# Patient Record
Sex: FEMALE | Race: WHITE | Employment: FULL TIME | URBAN - METROPOLITAN AREA
[De-identification: names, ages, dates, MRNs, and addresses within clinical notes are randomized per-mention and may not be internally consistent; named-entity substitution may affect disease eponyms.]

---

## 2019-08-18 RX ORDER — GUAIFENESIN 1200 MG/1
TABLET, EXTENDED RELEASE ORAL
COMMUNITY

## 2019-08-18 RX ORDER — RANITIDINE 150 MG/1
150 TABLET ORAL DAILY
COMMUNITY
End: 2021-02-01 | Stop reason: ALTCHOICE

## 2020-03-09 ENCOUNTER — TELEPHONE (OUTPATIENT)
Dept: FAMILY MEDICINE CLINIC | Age: 55
End: 2020-03-09

## 2020-03-09 RX ORDER — METRONIDAZOLE 500 MG/1
500 TABLET ORAL 3 TIMES DAILY
Qty: 30 TABLET | Refills: 0 | Status: SHIPPED | OUTPATIENT
Start: 2020-03-09 | End: 2020-03-19

## 2020-03-09 RX ORDER — CIPROFLOXACIN 500 MG/1
500 TABLET, FILM COATED ORAL 2 TIMES DAILY
Qty: 20 TABLET | Refills: 0 | Status: SHIPPED | OUTPATIENT
Start: 2020-03-09 | End: 2020-03-19

## 2020-03-09 NOTE — TELEPHONE ENCOUNTER
[facility]     Last visit- 04/03/19   No visit scheduled due to no insurance - having a diverticulitis flare and wants to know if you can just call in her medication.

## 2021-01-13 ENCOUNTER — OFFICE VISIT (OUTPATIENT)
Dept: FAMILY MEDICINE CLINIC | Age: 56
End: 2021-01-13
Payer: COMMERCIAL

## 2021-01-13 VITALS
HEIGHT: 64 IN | WEIGHT: 136 LBS | DIASTOLIC BLOOD PRESSURE: 70 MMHG | TEMPERATURE: 99.3 F | SYSTOLIC BLOOD PRESSURE: 112 MMHG | HEART RATE: 64 BPM | OXYGEN SATURATION: 97 % | BODY MASS INDEX: 23.22 KG/M2

## 2021-01-13 DIAGNOSIS — K59.00 CONSTIPATION, UNSPECIFIED CONSTIPATION TYPE: ICD-10-CM

## 2021-01-13 DIAGNOSIS — K27.9 PUD (PEPTIC ULCER DISEASE): ICD-10-CM

## 2021-01-13 DIAGNOSIS — R19.7 DIARRHEA, UNSPECIFIED TYPE: ICD-10-CM

## 2021-01-13 DIAGNOSIS — K57.92 DIVERTICULITIS: Primary | ICD-10-CM

## 2021-01-13 PROBLEM — K57.90 DIVERTICULOSIS: Status: ACTIVE | Noted: 2021-01-13

## 2021-01-13 PROCEDURE — 3017F COLORECTAL CA SCREEN DOC REV: CPT | Performed by: PHYSICIAN ASSISTANT

## 2021-01-13 PROCEDURE — G8420 CALC BMI NORM PARAMETERS: HCPCS | Performed by: PHYSICIAN ASSISTANT

## 2021-01-13 PROCEDURE — G8427 DOCREV CUR MEDS BY ELIG CLIN: HCPCS | Performed by: PHYSICIAN ASSISTANT

## 2021-01-13 PROCEDURE — G8484 FLU IMMUNIZE NO ADMIN: HCPCS | Performed by: PHYSICIAN ASSISTANT

## 2021-01-13 PROCEDURE — 99213 OFFICE O/P EST LOW 20 MIN: CPT | Performed by: PHYSICIAN ASSISTANT

## 2021-01-13 PROCEDURE — 4004F PT TOBACCO SCREEN RCVD TLK: CPT | Performed by: PHYSICIAN ASSISTANT

## 2021-01-13 RX ORDER — METRONIDAZOLE 500 MG/1
500 TABLET ORAL 3 TIMES DAILY
Qty: 30 TABLET | Refills: 0 | Status: SHIPPED | OUTPATIENT
Start: 2021-01-13 | End: 2021-01-23

## 2021-01-13 RX ORDER — CIPROFLOXACIN 500 MG/1
500 TABLET, FILM COATED ORAL 2 TIMES DAILY
Qty: 20 TABLET | Refills: 0 | Status: SHIPPED | OUTPATIENT
Start: 2021-01-13 | End: 2021-01-23

## 2021-01-13 RX ORDER — OMEPRAZOLE 40 MG/1
40 CAPSULE, DELAYED RELEASE ORAL DAILY
Qty: 30 CAPSULE | Refills: 2 | Status: SHIPPED | OUTPATIENT
Start: 2021-01-13 | End: 2021-02-01

## 2021-01-13 ASSESSMENT — PATIENT HEALTH QUESTIONNAIRE - PHQ9: 2. FEELING DOWN, DEPRESSED OR HOPELESS: 1

## 2021-01-13 NOTE — PROGRESS NOTES
History    Marital status: Single     Spouse name: None    Number of children: None    Years of education: None    Highest education level: None   Occupational History    None   Social Needs    Financial resource strain: None    Food insecurity     Worry: None     Inability: None    Transportation needs     Medical: None     Non-medical: None   Tobacco Use    Smoking status: Current Every Day Smoker     Packs/day: 2.00    Smokeless tobacco: Never Used   Substance and Sexual Activity    Alcohol use: No    Drug use: No    Sexual activity: Yes     Partners: Female   Lifestyle    Physical activity     Days per week: None     Minutes per session: None    Stress: None   Relationships    Social connections     Talks on phone: None     Gets together: None     Attends Christianity service: None     Active member of club or organization: None     Attends meetings of clubs or organizations: None     Relationship status: None    Intimate partner violence     Fear of current or ex partner: None     Emotionally abused: None     Physically abused: None     Forced sexual activity: None   Other Topics Concern    None   Social History Narrative    None        CURRENT MEDICATIONS  Current Outpatient Medications   Medication Sig Dispense Refill    ciprofloxacin (CIPRO) 500 MG tablet Take 1 tablet by mouth 2 times daily for 10 days 20 tablet 0    metroNIDAZOLE (FLAGYL) 500 MG tablet Take 1 tablet by mouth 3 times daily for 10 days 30 tablet 0    omeprazole (PRILOSEC) 40 MG delayed release capsule Take 1 capsule by mouth daily 30 capsule 2    Psyllium (METAMUCIL SMOOTH TEXTURE) 28.3 % POWD Take by mouth 1 tablespoon with 8 ounces of water every day. OTC      Probiotic Product (PROBIOTIC DAILY PO) Take 1 tablet by mouth daily      dextromethorphan-guaiFENesin (MUCINEX DM)  MG per SR tablet Take 1 tablet by mouth every 12 hours as needed.       guaiFENesin (MUCINEX MAXIMUM STRENGTH) 1200 MG TB12 Take by mouth 1 tablet daily. OTC      ranitidine (ZANTAC 150 MAXIMUM STRENGTH) 150 MG tablet Take 150 mg by mouth daily      ibuprofen (ADVIL;MOTRIN) 200 MG tablet Take 200 mg by mouth every 6 hours as needed for Pain. No current facility-administered medications for this visit. PHYSICAL EXAM    /70   Pulse 64   Temp 99.3 °F (37.4 °C)   Ht 5' 4\" (1.626 m)   Wt 136 lb (61.7 kg)   LMP 11/30/2013 (Within Years) Comment: states a year and a half ago  SpO2 97%   BMI 23.34 kg/m²     Physical Exam  Vitals signs and nursing note reviewed. Constitutional:       Appearance: Normal appearance. HENT:      Head: Normocephalic and atraumatic. Abdominal:      General: Abdomen is flat. Bowel sounds are normal.      Palpations: Abdomen is soft. Tenderness: There is abdominal tenderness in the right lower quadrant, suprapubic area and left lower quadrant. Neurological:      Mental Status: She is alert. Psychiatric:         Mood and Affect: Mood normal.         ASSESSMENT & PLAN    1. Diverticulitis  2. PUD (peptic ulcer disease)  3. Diarrhea, unspecified type  4. Constipation, unspecified constipation type  Patient symptoms are improving but she is still with tenderness so we will cover with Cipro and Flagyl. I do wonder if a lot of her symptoms may be related to untreated overproduction of acid that had previously caused her ulcer. She may not have an ulcer currently but may have gastritis that is contributing to some of her symptoms. I recommended her restart her omeprazole and may even try a gluten-free diet. We will refer to GI for further evaluation and treatment. In the meantime increase her fiber intake and maintain good hydration with water. - ciprofloxacin (CIPRO) 500 MG tablet; Take 1 tablet by mouth 2 times daily for 10 days  Dispense: 20 tablet; Refill: 0  - metroNIDAZOLE (FLAGYL) 500 MG tablet; Take 1 tablet by mouth 3 times daily for 10 days  Dispense: 30 tablet;  Refill: 0  - [facility] Holter, CNP, Gastroenterology, [place]  - omeprazole (PRILOSEC) 40 MG delayed release capsule; Take 1 capsule by mouth daily  Dispense: 30 capsule; Refill: 2             Electronically signed by Sloane David PA-C on 1/13/2021    Please note that this chart was generated using dragon dictation software. Although every effort was made to ensure the accuracy of this automated transcription, some errors in transcription may have occurred.

## 2021-02-01 ENCOUNTER — OFFICE VISIT (OUTPATIENT)
Dept: GASTROENTEROLOGY | Age: 56
End: 2021-02-01
Payer: COMMERCIAL

## 2021-02-01 VITALS
HEIGHT: 64 IN | BODY MASS INDEX: 23.47 KG/M2 | OXYGEN SATURATION: 97 % | TEMPERATURE: 96.7 F | HEART RATE: 80 BPM | SYSTOLIC BLOOD PRESSURE: 100 MMHG | WEIGHT: 137.5 LBS | DIASTOLIC BLOOD PRESSURE: 62 MMHG

## 2021-02-01 DIAGNOSIS — K58.2 IRRITABLE BOWEL SYNDROME WITH BOTH CONSTIPATION AND DIARRHEA: ICD-10-CM

## 2021-02-01 DIAGNOSIS — K92.1 BLACK STOOL: Primary | ICD-10-CM

## 2021-02-01 DIAGNOSIS — K21.9 GASTROESOPHAGEAL REFLUX DISEASE, UNSPECIFIED WHETHER ESOPHAGITIS PRESENT: ICD-10-CM

## 2021-02-01 DIAGNOSIS — Z87.19 HISTORY OF DIVERTICULITIS: ICD-10-CM

## 2021-02-01 PROCEDURE — G8484 FLU IMMUNIZE NO ADMIN: HCPCS | Performed by: NURSE PRACTITIONER

## 2021-02-01 PROCEDURE — 3017F COLORECTAL CA SCREEN DOC REV: CPT | Performed by: NURSE PRACTITIONER

## 2021-02-01 PROCEDURE — G8420 CALC BMI NORM PARAMETERS: HCPCS | Performed by: NURSE PRACTITIONER

## 2021-02-01 PROCEDURE — G8427 DOCREV CUR MEDS BY ELIG CLIN: HCPCS | Performed by: NURSE PRACTITIONER

## 2021-02-01 PROCEDURE — 4004F PT TOBACCO SCREEN RCVD TLK: CPT | Performed by: NURSE PRACTITIONER

## 2021-02-01 PROCEDURE — 99203 OFFICE O/P NEW LOW 30 MIN: CPT | Performed by: NURSE PRACTITIONER

## 2021-02-01 RX ORDER — POLYETHYLENE GLYCOL 3350, SODIUM SULFATE ANHYDROUS, SODIUM BICARBONATE, SODIUM CHLORIDE, POTASSIUM CHLORIDE 236; 22.74; 6.74; 5.86; 2.97 G/4L; G/4L; G/4L; G/4L; G/4L
4 POWDER, FOR SOLUTION ORAL ONCE
Qty: 4000 ML | Refills: 0 | Status: SHIPPED | OUTPATIENT
Start: 2021-02-01 | End: 2021-02-01

## 2021-02-01 ASSESSMENT — ENCOUNTER SYMPTOMS
VOMITING: 0
CONSTIPATION: 1
WHEEZING: 0
NAUSEA: 0
SHORTNESS OF BREATH: 0
BACK PAIN: 1
PHOTOPHOBIA: 0
COLOR CHANGE: 0
EYE PAIN: 0
ABDOMINAL PAIN: 0
COUGH: 0
DIARRHEA: 0
BLOOD IN STOOL: 0

## 2021-02-01 NOTE — PROGRESS NOTES
Esau Montoya 48 y.o. female was seen by PIOTR Aguiar on 2/1/2021     Wt Readings from Last 3 Encounters:   02/01/21 137 lb 8 oz (62.4 kg)   01/13/21 136 lb (61.7 kg)   11/26/16 150 lb (68 kg)       HPI  Esau Montoya is a pleasant 48 y.o.  female who presents today for black stools, history of diverticulitis and irritable bowel syndrome. She has a past medical history of diverticular disease and irritable bowel. She denies NSAID or alcohol use. Her first attack of diverticulitis was in 5- with CT of abdomen/pelvis showing diverticulitis. Her last EGD was done thirty years ago with uncertain results. Her last colonoscopy was done five years ago at [facility] with per patient record normal colonoscopy. She was treated for diverticulitis a week ago and completed her antibiotics on Sunday. Her bowel pattern fluctuates with diarrhea and constipation. She mentioned her bowel pattern this week is daily with soft brown formed stools. This can alternate with segmented stools with mucus. She is taking essental oils digize at night. Eating salads causes diarrhea. She noticed black tarry stools two weeks ago. No excess belching or excess flatulence. Her appetite is poor with early satiety. Her weight is down twenty pounds. She is eating small meals. No nausea or vomiting. No abdominal pain, bloating or distention. Her heartburn and acid reflux has been ongoing for over fifteen years. She was on Zantac in the past and mentioned last use of Prilosec was fifteen years ago. She takes Tums on occasion. No nocturnal awakenings with acid reflux. She is avoiding caffeine and alcohol. No dysphagia or pain with swallowing. No family history of colon or stomach cancer. ROS  Review of Systems   Constitutional: Positive for appetite change. Negative for chills, diaphoresis, fatigue and fever. HENT: Negative for ear pain, hearing loss and tinnitus.     Eyes: Negative for photophobia, pain and visual disturbance. Respiratory: Negative for cough, shortness of breath and wheezing. Cardiovascular: Negative for chest pain, palpitations and leg swelling. Gastrointestinal: Positive for constipation. Negative for abdominal pain, blood in stool, diarrhea, nausea and vomiting. Endocrine: Negative for cold intolerance, heat intolerance and polydipsia. Genitourinary: Positive for frequency. Negative for dysuria and urgency. Musculoskeletal: Positive for back pain. Negative for myalgias and neck pain. Skin: Negative for color change, pallor and rash. Allergic/Immunologic: Positive for food allergies (Milk). Negative for environmental allergies. Neurological: Negative for dizziness, seizures, weakness and headaches. Hematological: Bruises/bleeds easily. Psychiatric/Behavioral: Positive for dysphoric mood. Negative for sleep disturbance and suicidal ideas. The patient is not nervous/anxious. Allergies  Allergies   Allergen Reactions    Lac Bovis Other (See Comments)     Unknown    Other Other (See Comments)     Grass - Unknown    Zithromax [Azithromycin] Hives    Augmentin [Amoxicillin-Pot Clavulanate] Nausea And Vomiting    Oxycodone Nausea And Vomiting    Percocet [Oxycodone-Acetaminophen] Nausea And Vomiting    Vicodin [Hydrocodone-Acetaminophen] Nausea And Vomiting       Medications  Current Outpatient Medications   Medication Sig Dispense Refill    omeprazole (PRILOSEC) 40 MG delayed release capsule Take 1 capsule by mouth daily 30 capsule 2    Psyllium (METAMUCIL SMOOTH TEXTURE) 28.3 % POWD Take by mouth 1 tablespoon with 8 ounces of water every day. OTC      guaiFENesin (MUCINEX MAXIMUM STRENGTH) 1200 MG TB12 Take by mouth 1 tablet daily.  OTC      Probiotic Product (PROBIOTIC DAILY PO) Take 1 tablet by mouth daily      ranitidine (ZANTAC 150 MAXIMUM STRENGTH) 150 MG tablet Take 150 mg by mouth daily      ibuprofen (ADVIL;MOTRIN) 200 MG tablet Take 200 mg by mouth every 6 hours as needed for Pain.  dextromethorphan-guaiFENesin (MUCINEX DM)  MG per SR tablet Take 1 tablet by mouth every 12 hours as needed. No current facility-administered medications for this visit. Past medical history:   She has a past medical history of Diverticular disease and Irritable bowel. Past surgical history:  She has a past surgical history that includes Cholecystectomy (2015); Dilation and curettage of uterus (1988); Nasal septum surgery (1992); and Hysterectomy (12/2/15). Social History:  She reports that she has been smoking. She has been smoking about 2.00 packs per day. She has never used smokeless tobacco. She reports that she does not drink alcohol or use drugs. Family history:  Her family history includes Diabetes in her father; High Blood Pressure in her sister. Objective    Vitals:    02/01/21 1437   BP: 100/62   Pulse: 80   Temp: 96.7 °F (35.9 °C)   SpO2: 97%        Physical exam    Physical Exam  Vitals signs reviewed. Constitutional:       General: She is not in acute distress. Appearance: She is well-developed and normal weight. She is not ill-appearing, toxic-appearing or diaphoretic. HENT:      Head: Normocephalic and atraumatic. Nose: Nose normal.      Mouth/Throat:      Mouth: Mucous membranes are moist.   Eyes:      Conjunctiva/sclera: Conjunctivae normal.      Pupils: Pupils are equal, round, and reactive to light. Neck:      Musculoskeletal: Normal range of motion and neck supple. Thyroid: No thyromegaly. Vascular: No JVD. Trachea: No tracheal deviation. Cardiovascular:      Rate and Rhythm: Normal rate and regular rhythm. Pulses: Normal pulses. Heart sounds: Normal heart sounds. No murmur. No friction rub. No gallop. Pulmonary:      Effort: Pulmonary effort is normal. No respiratory distress. Breath sounds: Normal breath sounds. No stridor. No wheezing, rhonchi or rales. Chest:      Chest wall: No tenderness. Abdominal:      General: Bowel sounds are normal. There is no distension. Palpations: Abdomen is soft. There is no mass. Tenderness: There is no abdominal tenderness. There is no guarding or rebound. Hernia: No hernia is present. Musculoskeletal: Normal range of motion. Lymphadenopathy:      Cervical: No cervical adenopathy. Skin:     General: Skin is warm and dry. Neurological:      Mental Status: She is alert and oriented to person, place, and time. Psychiatric:         Mood and Affect: Mood normal.         No visits with results within 2 Month(s) from this visit.    Latest known visit with results is:   Admission on 11/26/2016, Discharged on 11/26/2016   Component Date Value Ref Range Status    Ventricular Rate 11/26/2016 62  BPM Final    Atrial Rate 11/26/2016 62  BPM Final    P-R Interval 11/26/2016 112  ms Final    QRS Duration 11/26/2016 92  ms Final    Q-T Interval 11/26/2016 448  ms Final    QTc Calculation (Bazett) 11/26/2016 454  ms Final    P Axis 11/26/2016 59  degrees Final    R Axis 11/26/2016 66  degrees Final    T Axis 11/26/2016 62  degrees Final    Diagnosis 11/26/2016    Final                    Value:Normal sinus rhythm with sinus arrhythmia  Nonspecific ST abnormality  Abnormal ECG  No previous ECGs available  Confirmed by Israel Iqbal MD, Ramila Botello ([id number]) on 11/27/2016 3:36:06 PM      WBC 11/26/2016 11.5* 4.0 - 10.5 K/CU MM Final    RBC 11/26/2016 4.82  4.2 - 5.4 M/CU MM Final    Hemoglobin 11/26/2016 15.7  12.5 - 16.0 GM/DL Final    Hematocrit 11/26/2016 44.4  37 - 47 % Final    MCV 11/26/2016 92.1  78 - 100 FL Final    MCH 11/26/2016 32.6* 27 - 31 PG Final    MCHC 11/26/2016 35.4  32.0 - 36.0 % Final    RDW 11/26/2016 12.2  11.7 - 14.9 % Final    Platelets 09/84/5878 283  140 - 440 K/CU MM Final    MPV 11/26/2016 9.3  7.5 - 11.1 FL Final    Differential Type 11/26/2016 AUTOMATED DIFFERENTIAL   Final    Segs Relative 11/26/2016 76.8* 36 - 66 % Final    Lymphocytes % 11/26/2016 19.3* 24 - 44 % Final    Monocytes % 11/26/2016 3.4  0 - 4 % Final    Eosinophils % 11/26/2016 0.0  0 - 3 % Final    Basophils % 11/26/2016 0.2  0 - 1 % Final    Segs Absolute 11/26/2016 8.8  K/CU MM Final    Lymphocytes Absolute 11/26/2016 2.2  K/CU MM Final    Monocytes Absolute 11/26/2016 0.4  K/CU MM Final    Eosinophils Absolute 11/26/2016 0.0  K/CU MM Final    Basophils Absolute 11/26/2016 0.0  K/CU MM Final    Nucleated RBC % 11/26/2016 0.0  % Final    Total Nucleated RBC 11/26/2016 0.0  K/CU MM Final    Total Immature Neutrophil 11/26/2016 0.04  K/CU MM Final    Immature Neutrophil % 11/26/2016 0.3  0 - 0.43 % Final    Sodium 11/26/2016 140  135 - 145 MMOL/L Final    Potassium 11/26/2016 3.6  3.5 - 5.1 MMOL/L Final    Chloride 11/26/2016 98* 99 - 110 mMol/L Final    CO2 11/26/2016 21  21 - 32 MMOL/L Final    BUN 11/26/2016 13  6 - 23 MG/DL Final    CREATININE 11/26/2016 1.0  0.6 - 1.1 MG/DL Final    Glucose, Fasting 11/26/2016 157* 70 - 99 MG/DL Final    Calcium 11/26/2016 9.9  8.3 - 10.6 MG/DL Final    Albumin 11/26/2016 4.8  3.4 - 5.0 GM/DL Final    Total Protein 11/26/2016 7.5  6.4 - 8.2 GM/DL Final    Total Bilirubin 11/26/2016 0.8  0.0 - 1.0 MG/DL Final    ALT 11/26/2016 31  10 - 40 U/L Final    AST 11/26/2016 34  15 - 37 IU/L Final    Alkaline Phosphatase 11/26/2016 91  40 - 129 IU/L Final    GFR Non- 11/26/2016 59  mL/min/1.73m2 Final    GFR  11/26/2016 >60  mL/min/1.73m2 Final    Anion Gap 11/26/2016 21* 4 - 16 Final    Color, UA 11/26/2016 YELLOW  UYELL Final    Clarity, UA 11/26/2016 CLEAR  CLEAR Final    Glucose, Urine 11/26/2016 NEGATIVE  NEG MG/DL Final    Bilirubin Urine 11/26/2016 NEGATIVE  NEG MG/DL Final    Ketones, Urine 11/26/2016 LARGE* NEG MG/DL Final    Specific [place], UA 11/26/2016 1.026  1.001 - 1.035 Final    Blood, Urine 11/26/2016 NEGATIVE  NEG Final    pH, Urine 11/26/2016 9.0* 5.0 - 8.0 Final    Protein, UA 11/26/2016 100* NEG MG/DL Final    Urobilinogen, Urine 11/26/2016 NORMAL  0.2 - 1.0 EU/DL Final    Nitrite Urine, Quantitative 11/26/2016 NEGATIVE  NEG Final    Leukocyte Esterase, Urine 11/26/2016 NEGATIVE  NEG Final    RBC, UA 11/26/2016 4  0 - 6 /HPF Final    WBC, UA 11/26/2016 1  0 - 5 /HPF Final    Bacteria, UA 11/26/2016 RARE* NEG /HPF Final    Squam Epithel, UA 11/26/2016 <1  /HPF Final    Mucus, UA 11/26/2016 OCCASIONAL* NEG HPF Final    Troponin T 11/26/2016 <0.010  <0.01 NG/ML Final    Troponin T 11/26/2016 <0.010  <0.01 NG/ML Final       Assessment and Plan:  1. Will plan for an EGD/colonoscopy with MAC anesthesia. The patient was informed of the risks and benefits of the procedures. 2.  Black stools will order EGD/colonoscopy to rule out peptic ulcer disease and GI source for bleeding. Will order CBC to evaluate for anemia. The patient was instructed to avoid NSAIDs. 3.  Acid reflux vs. Dyspepsia that is long term. The patient was encouraged to restart taking Prilosec 40 mg daily for treatment. The patient was encouraged to continue with anti-reflux measures and avoid foods that trigger. 4.  History of diverticulitis recommend colonoscopy to rule out neoplasm. The patient was encouraged to continue with a high fiber diet. Recommend good bowel regimen with an increase in fruit, fiber and fluids to prevent constipation. 5.  IBS with mix of diarrhea/constipation. Her bowel pattern is stable at this time. The patient was encouraged to continue taking Metamucil daily. Recommend increasing fruit, fiber and fluids. 6.  Further recommendations for follow-up will be determined after the EGD/colonoscopy have been completed.

## 2021-02-02 RX ORDER — OMEPRAZOLE 40 MG/1
40 CAPSULE, DELAYED RELEASE ORAL
Qty: 90 CAPSULE | Refills: 3 | Status: SHIPPED | OUTPATIENT
Start: 2021-02-02 | End: 2022-06-21

## 2021-02-03 DIAGNOSIS — Z01.818 PRE-OP TESTING: Primary | ICD-10-CM

## 2021-02-04 ENCOUNTER — PREP FOR PROCEDURE (OUTPATIENT)
Dept: GASTROENTEROLOGY | Age: 56
End: 2021-02-04

## 2021-02-04 RX ORDER — SODIUM CHLORIDE, SODIUM LACTATE, POTASSIUM CHLORIDE, CALCIUM CHLORIDE 600; 310; 30; 20 MG/100ML; MG/100ML; MG/100ML; MG/100ML
INJECTION, SOLUTION INTRAVENOUS CONTINUOUS
Status: CANCELLED | OUTPATIENT
Start: 2021-02-04

## 2021-02-04 RX ORDER — SODIUM CHLORIDE 0.9 % (FLUSH) 0.9 %
10 SYRINGE (ML) INJECTION EVERY 12 HOURS SCHEDULED
Status: CANCELLED | OUTPATIENT
Start: 2021-02-04

## 2021-02-04 RX ORDER — SODIUM CHLORIDE 0.9 % (FLUSH) 0.9 %
10 SYRINGE (ML) INJECTION PRN
Status: CANCELLED | OUTPATIENT
Start: 2021-02-04

## 2021-02-05 ENCOUNTER — NURSE ONLY (OUTPATIENT)
Dept: SURGERY | Age: 56
End: 2021-02-05
Payer: COMMERCIAL

## 2021-02-05 ENCOUNTER — HOSPITAL ENCOUNTER (OUTPATIENT)
Age: 56
Setting detail: SPECIMEN
Discharge: HOME | End: 2021-02-05

## 2021-02-05 ENCOUNTER — TELEPHONE (OUTPATIENT)
Dept: PULMONOLOGY | Age: 56
End: 2021-02-05

## 2021-02-05 ENCOUNTER — HOSPITAL ENCOUNTER (OUTPATIENT)
Age: 56
Discharge: HOME | End: 2021-02-05

## 2021-02-05 VITALS — TEMPERATURE: 98.4 F | DIASTOLIC BLOOD PRESSURE: 80 MMHG | SYSTOLIC BLOOD PRESSURE: 124 MMHG

## 2021-02-05 DIAGNOSIS — Z01.818 PRE-OP TESTING: Primary | ICD-10-CM

## 2021-02-05 LAB
ALBUMIN SERPL-MCNC: 4.3 GM/DL (ref 3.4–5)
ALP BLD-CCNC: 60 IU/L (ref 40–128)
ALT SERPL-CCNC: 23 U/L (ref 10–40)
ANION GAP SERPL CALCULATED.3IONS-SCNC: 9 MMOL/L (ref 4–16)
AST SERPL-CCNC: 25 IU/L (ref 15–37)
BASOPHILS ABSOLUTE: 0 K/CU MM
BASOPHILS RELATIVE PERCENT: 0.4 % (ref 0–1)
BILIRUB SERPL-MCNC: 0.2 MG/DL (ref 0–1)
BUN BLDV-MCNC: 10 MG/DL (ref 6–23)
CALCIUM SERPL-MCNC: 9 MG/DL (ref 8.3–10.6)
CHLORIDE BLD-SCNC: 105 MMOL/L (ref 99–110)
CO2: 27 MMOL/L (ref 21–32)
CREAT SERPL-MCNC: 0.8 MG/DL (ref 0.6–1.1)
DIFFERENTIAL TYPE: ABNORMAL
EOSINOPHILS ABSOLUTE: 0.1 K/CU MM
EOSINOPHILS RELATIVE PERCENT: 1 % (ref 0–3)
GFR AFRICAN AMERICAN: >60 ML/MIN/1.73M2
GFR NON-AFRICAN AMERICAN: >60 ML/MIN/1.73M2
GLUCOSE BLD-MCNC: 110 MG/DL (ref 70–99)
HCT VFR BLD CALC: 43.4 % (ref 37–47)
HEMOGLOBIN: 14.1 GM/DL (ref 12.5–16)
IMMATURE NEUTROPHIL %: 0.3 % (ref 0–0.43)
LYMPHOCYTES ABSOLUTE: 3 K/CU MM
LYMPHOCYTES RELATIVE PERCENT: 42 % (ref 24–44)
MCH RBC QN AUTO: 32.3 PG (ref 27–31)
MCHC RBC AUTO-ENTMCNC: 32.5 % (ref 32–36)
MCV RBC AUTO: 99.3 FL (ref 78–100)
MONOCYTES ABSOLUTE: 0.5 K/CU MM
MONOCYTES RELATIVE PERCENT: 7.4 % (ref 0–4)
NUCLEATED RBC %: 0 %
PDW BLD-RTO: 12.3 % (ref 11.7–14.9)
PLATELET # BLD: 248 K/CU MM (ref 140–440)
PMV BLD AUTO: 9 FL (ref 7.5–11.1)
POTASSIUM SERPL-SCNC: 4.5 MMOL/L (ref 3.5–5.1)
RBC # BLD: 4.37 M/CU MM (ref 4.2–5.4)
SEGMENTED NEUTROPHILS ABSOLUTE COUNT: 3.5 K/CU MM
SEGMENTED NEUTROPHILS RELATIVE PERCENT: 48.9 % (ref 36–66)
SODIUM BLD-SCNC: 141 MMOL/L (ref 135–145)
TOTAL IMMATURE NEUTOROPHIL: 0.02 K/CU MM
TOTAL NUCLEATED RBC: 0 K/CU MM
TOTAL PROTEIN: 6.4 GM/DL (ref 6.4–8.2)
WBC # BLD: 7.1 K/CU MM (ref 4–10.5)

## 2021-02-05 PROCEDURE — 85025 COMPLETE CBC W/AUTO DIFF WBC: CPT

## 2021-02-05 PROCEDURE — 99211 OFF/OP EST MAY X REQ PHY/QHP: CPT | Performed by: NURSE PRACTITIONER

## 2021-02-05 PROCEDURE — 80053 COMPREHEN METABOLIC PANEL: CPT

## 2021-02-05 PROCEDURE — U0002 COVID-19 LAB TEST NON-CDC: HCPCS

## 2021-02-05 PROCEDURE — 36415 COLL VENOUS BLD VENIPUNCTURE: CPT

## 2021-02-05 NOTE — PROGRESS NOTES
Patient collected pre-op COVID-19 screening instruction and collection supplies given to patient accordingly. Patient denies fever/cough/sob or recent travels. Patient voiced understanding of collection/quarantine instructions. COVID screening lab ordered, collection completed without difficulty, identifiers placed on specimen. AVS given at discharge. Results will be given via mychart or telephone call [facility] Dept will manage any positive test results, the procedure will be rescheduled at a later date).

## 2021-02-05 NOTE — PATIENT INSTRUCTIONS
Pre-Procedure COVID-19 Self Testing  Quarantine Instructions  Day of Surgery Instructions         What to do before my surgery:    All patients scheduled for elective surgery must test for COVID19 72-96 hours prior to the surgery date.  Pre-Procedure COVID-19 Self-Test will be scheduled for you by your provider.  You can receive your Pre-Procedure COVID-19 Self-Test at:  [facility]. [address], [facility], [facility], [address]   If you do not have the COVID-19 test we will cancel or reschedule your procedure   Once you test you must quarantine at home until after your procedure with only your immediate family members or whoever lives with you.  If you must work during your quarantine period, we ask that you continue to practice social distancing, wear a mask that covers your mouth and nose and perform all hand hygiene as recommended by the CDC.  If you must go to the grocery, etc. and cannot get someone to do this for you please wear a mask that covers your mouth and nose and perform all hand hygiene as recommended by the CDC.  Your surgeon's office will notify you with any concerns about your test result. What can I expect on the day of surgery?  Arrive at the time the office or hospital staff tell you on the day of your procedure.  Wear a mask when entering the hospital.     A member of the hospital staff will take your temperature and ask you a few questions as you enter the building.  In abundance of caution for the safety of all our patients and staff, please follow all hospital visitor guidelines in place at the time of your procedure. The staff caring for you will stay in close communication with your loved one and keep them updated on progress.  Please provide a phone number for us to use when communicating with your family or ride home.    When you are ready to discharge, we will notify your family/person with you to bring the car to the front entrance. We will take you to them after you receive all of your discharge instructions.

## 2021-02-05 NOTE — TELEPHONE ENCOUNTER
Per my call to Ondina Huerta at [facility] KWADWO BRENNER is necessary for C-scope if done at OP facility.  VPQ#[id number]

## 2021-02-06 LAB
SARS-COV-2: NOT DETECTED
SOURCE: NORMAL

## 2021-02-07 LAB
SARS-COV-2: NOT DETECTED
SOURCE: NORMAL

## 2021-02-08 NOTE — PROGRESS NOTES
Surgery 02/12/21 @ 1000 arrive @ 0845               1. Do not eat or drink anything after midnight - unless instructed by your doctor prior to surgery. This includes                   no water, chewing gum or mints. 2. Follow your directions as prescribed by the doctor for your procedure and medications. Take Omeprazole with a sip morning of procedure. 3. Check with your Doctor regarding stopping Plavix, Coumadin, Lovenox,Effient,Pradaxa,Xarelto, Fragmin or other blood thinners and                   follow their instructions. 4. Do not smoke, and do not drink any alcoholic beverages 24 hours prior to surgery. This includes NA Beer. 5. You may brush your teeth and gargle the morning of surgery. DO NOT SWALLOW WATER   6. You MUST make arrangements for a responsible adult to take you home after your surgery and be able to check on you every couple                   hours for the day. You will not be allowed to leave alone or drive yourself home. It is strongly suggested someone stay with you the first 24                   hrs. Your surgery will be cancelled if you do not have a ride home. 7. Please wear simple, loose fitting clothing to the hospital.  Luz Vasquez not bring valuables (money, credit cards, checkbooks, etc.) Do not wear any                   makeup (including no eye makeup) or nail polish on your fingers or toes. 8. DO NOT wear any jewelry or piercings on day of surgery. All body piercing jewelry must be removed. 9. If you have dentures, they will be removed before going to the OR; we will provide you a container. If you wear contact lenses or glasses,                  they will be removed; please bring a case for them. 10. If you  have a Living Will and Durable Power of  for Healthcare, please bring in a copy. 11. Please bring picture ID,  insurance card, paperwork from the doctors office    (H & P, Consent, & card for implantable devices).

## 2021-02-10 ENCOUNTER — ANESTHESIA EVENT (OUTPATIENT)
Dept: OPERATING ROOM | Age: 56
End: 2021-02-10
Payer: COMMERCIAL

## 2021-02-10 ASSESSMENT — LIFESTYLE VARIABLES: SMOKING_STATUS: 1

## 2021-02-10 NOTE — ANESTHESIA PRE PROCEDURE
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       Past Medical History:        Diagnosis Date    Diverticular disease     has had 2 episodes of diverticulitis    Irritable bowel        Past Surgical History:        Procedure Laterality Date    CHOLECYSTECTOMY  2015    DILATION AND CURETTAGE OF UTERUS  1988    ENDOSCOPY, COLON, DIAGNOSTIC      HYSTERECTOMY  12/2/15    robotic assisted LAVH BSO    NASAL SEPTUM SURGERY  1992       Social History:    Social History     Tobacco Use    Smoking status: Current Every Day Smoker     Packs/day: 2.00    Smokeless tobacco: Never Used   Substance Use Topics    Alcohol use: No                                Ready to quit: Not Answered  Counseling given: Not Answered      Vital Signs (Current):   Vitals:    02/08/21 1035   Weight: 137 lb (62.1 kg)   Height: 5' 4\" (1.626 m)                                              BP Readings from Last 3 Encounters:   02/05/21 124/80   02/01/21 100/62   01/13/21 112/70       NPO Status:                                                                                 BMI:   Wt Readings from Last 3 Encounters:   02/01/21 137 lb 8 oz (62.4 kg)   01/13/21 136 lb (61.7 kg)   11/26/16 150 lb (68 kg)     Body mass index is 23.52 kg/m².     CBC:   Lab Results   Component Value Date    WBC 7.1 02/05/2021    RBC 4.37 02/05/2021    HGB 14.1 02/05/2021    HCT 43.4 02/05/2021    MCV 99.3 02/05/2021    RDW 12.3 02/05/2021     02/05/2021       CMP:   Lab Results   Component Value Date     02/05/2021    K 4.5 02/05/2021     02/05/2021 CO2 27 02/05/2021    BUN 10 02/05/2021    CREATININE 0.8 02/05/2021    GFRAA >60 02/05/2021    LABGLOM >60 02/05/2021    GLUCOSE 110 02/05/2021    PROT 6.4 02/05/2021    CALCIUM 9.0 02/05/2021    BILITOT 0.2 02/05/2021    ALKPHOS 60 02/05/2021    AST 25 02/05/2021    ALT 23 02/05/2021       POC Tests: No results for input(s): POCGLU, POCNA, POCK, POCCL, POCBUN, POCHEMO, POCHCT in the last 72 hours. Coags:   Lab Results   Component Value Date    APTT 26.5 11/30/2015       HCG (If Applicable):   Lab Results   Component Value Date    PREGTESTUR negative 05/30/2013        ABGs: No results found for: PHART, PO2ART, JFF6DXM, TPT3LKN, BEART, P0ILFANT     Type & Screen (If Applicable):  No results found for: LABABO, LABRH    Drug/Infectious Status (If Applicable):  No results found for: HIV, HEPCAB    COVID-19 Screening (If Applicable):   Lab Results   Component Value Date    COVID19 NOT DETECTED 02/05/2021         Anesthesia Evaluation  Patient summary reviewed and Nursing notes reviewed no history of anesthetic complications:   Airway: Mallampati: II  TM distance: >3 FB   Neck ROM: full  Mouth opening: > = 3 FB Dental: normal exam         Pulmonary: breath sounds clear to auscultation  (+) current smoker          Patient did not smoke on day of surgery. Cardiovascular:  Exercise tolerance: good (>4 METS),           Rhythm: regular  Rate: normal           Beta Blocker:  Not on Beta Blocker         Neuro/Psych:   Negative Neuro/Psych ROS              GI/Hepatic/Renal:   (+) PUD,           Endo/Other: Negative Endo/Other ROS                    Abdominal:           Vascular: negative vascular ROS. Anesthesia Plan      MAC     ASA 2     ( Pre Anesthesia Assessment complete. Chart reviewed on 2/10/2021)  Induction: intravenous. Anesthetic plan and risks discussed with patient. Plan discussed with JD.                 FABRICE Chambers - JD   2/10/2021

## 2021-02-11 ENCOUNTER — TELEPHONE (OUTPATIENT)
Dept: GASTROENTEROLOGY | Age: 56
End: 2021-02-11

## 2021-02-12 ENCOUNTER — ANESTHESIA (OUTPATIENT)
Dept: OPERATING ROOM | Age: 56
End: 2021-02-12
Payer: COMMERCIAL

## 2021-02-12 ENCOUNTER — HOSPITAL ENCOUNTER (OUTPATIENT)
Age: 56
Setting detail: OUTPATIENT SURGERY
Discharge: HOME | End: 2021-02-12
Attending: SURGERY | Admitting: SURGERY
Payer: COMMERCIAL

## 2021-02-12 VITALS
OXYGEN SATURATION: 97 % | DIASTOLIC BLOOD PRESSURE: 86 MMHG | BODY MASS INDEX: 23.39 KG/M2 | TEMPERATURE: 97.6 F | RESPIRATION RATE: 16 BRPM | HEART RATE: 88 BPM | SYSTOLIC BLOOD PRESSURE: 108 MMHG | HEIGHT: 64 IN | WEIGHT: 137 LBS

## 2021-02-12 VITALS
TEMPERATURE: 98.6 F | DIASTOLIC BLOOD PRESSURE: 47 MMHG | SYSTOLIC BLOOD PRESSURE: 104 MMHG | OXYGEN SATURATION: 100 % | RESPIRATION RATE: 16 BRPM

## 2021-02-12 PROCEDURE — 2580000003 HC RX 258: Performed by: NURSE PRACTITIONER

## 2021-02-12 PROCEDURE — 3609012400 HC EGD TRANSORAL BIOPSY SINGLE/MULTIPLE: Performed by: SURGERY

## 2021-02-12 PROCEDURE — 7100000011 HC PHASE II RECOVERY - ADDTL 15 MIN: Performed by: SURGERY

## 2021-02-12 PROCEDURE — 43239 EGD BIOPSY SINGLE/MULTIPLE: CPT | Performed by: SURGERY

## 2021-02-12 PROCEDURE — 7100000010 HC PHASE II RECOVERY - FIRST 15 MIN: Performed by: SURGERY

## 2021-02-12 PROCEDURE — 3700000001 HC ADD 15 MINUTES (ANESTHESIA): Performed by: SURGERY

## 2021-02-12 PROCEDURE — 6370000000 HC RX 637 (ALT 250 FOR IP): Performed by: SURGERY

## 2021-02-12 PROCEDURE — 3700000000 HC ANESTHESIA ATTENDED CARE: Performed by: SURGERY

## 2021-02-12 PROCEDURE — 88305 TISSUE EXAM BY PATHOLOGIST: CPT | Performed by: PATHOLOGY

## 2021-02-12 PROCEDURE — 6360000002 HC RX W HCPCS: Performed by: NURSE ANESTHETIST, CERTIFIED REGISTERED

## 2021-02-12 PROCEDURE — 88342 IMHCHEM/IMCYTCHM 1ST ANTB: CPT | Performed by: PATHOLOGY

## 2021-02-12 PROCEDURE — 3609027000 HC COLONOSCOPY: Performed by: SURGERY

## 2021-02-12 PROCEDURE — 2709999900 HC NON-CHARGEABLE SUPPLY: Performed by: SURGERY

## 2021-02-12 PROCEDURE — 45378 DIAGNOSTIC COLONOSCOPY: CPT | Performed by: SURGERY

## 2021-02-12 RX ORDER — SIMETHICONE 20 MG/.3ML
EMULSION ORAL PRN
Status: DISCONTINUED | OUTPATIENT
Start: 2021-02-12 | End: 2021-02-12 | Stop reason: ALTCHOICE

## 2021-02-12 RX ORDER — SODIUM CHLORIDE 0.9 % (FLUSH) 0.9 %
10 SYRINGE (ML) INJECTION EVERY 12 HOURS SCHEDULED
Status: DISCONTINUED | OUTPATIENT
Start: 2021-02-12 | End: 2021-02-12 | Stop reason: HOSPADM

## 2021-02-12 RX ORDER — PROPOFOL 10 MG/ML
INJECTION, EMULSION INTRAVENOUS PRN
Status: DISCONTINUED | OUTPATIENT
Start: 2021-02-12 | End: 2021-02-12 | Stop reason: SDUPTHER

## 2021-02-12 RX ORDER — SODIUM CHLORIDE 0.9 % (FLUSH) 0.9 %
10 SYRINGE (ML) INJECTION PRN
Status: DISCONTINUED | OUTPATIENT
Start: 2021-02-12 | End: 2021-02-12 | Stop reason: HOSPADM

## 2021-02-12 RX ORDER — LIDOCAINE HYDROCHLORIDE 20 MG/ML
INJECTION, SOLUTION INTRAVENOUS PRN
Status: DISCONTINUED | OUTPATIENT
Start: 2021-02-12 | End: 2021-02-12 | Stop reason: SDUPTHER

## 2021-02-12 RX ORDER — SODIUM CHLORIDE, SODIUM LACTATE, POTASSIUM CHLORIDE, CALCIUM CHLORIDE 600; 310; 30; 20 MG/100ML; MG/100ML; MG/100ML; MG/100ML
INJECTION, SOLUTION INTRAVENOUS CONTINUOUS
Status: DISCONTINUED | OUTPATIENT
Start: 2021-02-12 | End: 2021-02-12 | Stop reason: HOSPADM

## 2021-02-12 RX ADMIN — LIDOCAINE HYDROCHLORIDE 100 MG: 20 INJECTION, SOLUTION INTRAVENOUS at 10:59

## 2021-02-12 RX ADMIN — PROPOFOL 80 MG: 10 INJECTION, EMULSION INTRAVENOUS at 10:59

## 2021-02-12 RX ADMIN — SODIUM CHLORIDE, POTASSIUM CHLORIDE, SODIUM LACTATE AND CALCIUM CHLORIDE: 600; 310; 30; 20 INJECTION, SOLUTION INTRAVENOUS at 09:19

## 2021-02-12 RX ADMIN — PROPOFOL 320 MG: 10 INJECTION, EMULSION INTRAVENOUS at 11:01

## 2021-02-12 ASSESSMENT — PAIN - FUNCTIONAL ASSESSMENT: PAIN_FUNCTIONAL_ASSESSMENT: 0-10

## 2021-02-12 NOTE — PROCEDURES
PROCEDURE NOTE    DATE OF PROCEDURE: 2/12/2021     SURGEON: Jarocho Baugh M.D.    ASSISTANT: None    PREOPERATIVE DIAGNOSIS: mild antral gastritis    POSTOPERATIVE DIAGNOSIS: Same    OPERATION: Esophagogastroduodenoscopy with biopsies    ANESTHESIA: Local monitored anesthesia    ESTIMATED BLOOD LOSS: None    COMPLICATIONS: None apparent    SPECIMENS: were obtained    HISTORY: The patient is a 48y.o. year old female with history of the above preoperative diagnosis. I recommended esophagogastroduodenoscopy with possible biopsy and I explained the risk, benefits, expected outcome, and alternatives to the procedure. Risks included but are not limited to bleeding, infection, respiratory distress, hypotension, and perforation of the esophagus, stomach, or duodenum. Patient understands and is in agreement, wishing to proceed. PROCEDURE: The patient was brought into the endoscopy suite and the appropriate monitors were connected to the patient. A timeout was held with all members of the procedure team present and in agreement. The patient was positioned in the left lateral decubitus position with a bite block in place. The endoscope was inserted into the patient's mouth and advanced from the oropharynx into the hypopharynx without difficulty and under direct vision. The scope was then advanced through the patient's esophagus under direct vision into the stomach, pylorus, and duodenum. A retroflexed view of the gastroesophageal junction was performed. Biopsies were taken. A summary of the findings are as follows:      Duodenum:     Descending: normal    Bulb: normal    Stomach:    Antrum: mild to moderate gastritis, biopsies taken for H.pylori    Body: normal    Fundus: normal    Esophagus: normal    Larynx: normal    The stomach was desufflated and the endoscope was removed.  The patient tolerated the procedure well, and was transferred to the PACU following the procedure in stable condition. IMPRESSION/PLAN:   1. Continue PPI. Will follow path results for H.pylori testing.     Electronically signed by Arina Cheng MD on 2/12/2021 at 11:53 AM

## 2021-02-12 NOTE — PROCEDURES
PROCEDURE NOTE    DATE OF PROCEDURE: 2/12/2021    SURGEON: PRITI Herbert Deeds: None    PREOPERATIVE DIAGNOSIS: dark stools, recent diverticulitis    POSTOPERATIVE DIAGNOSIS:   1. Sigmoid Diverticulosis, severe  2. Internal and external hemorrhoids     OPERATION: Total colonoscopy     ANESTHESIA: Local monitored anesthesia. ESTIMATED BLOOD LOSS: None. COMPLICATIONS: None. SPECIMENS: were not obtained    HISTORY: The patient is a 48y.o. year old female with history of above preop diagnosis. I recommended colonoscopy with possible biopsy or polypectomy and I explained the risk, benefits, expected outcome, and alternatives to the procedure. Risks included but are not limited to bleeding, infection, respiratory distress, hypotension, and perforation of the colon. The patient understands and was in agreement. PROCEDURE: The patient was given sedation per anesthesia. The patient was given oxygen by nasal cannula. The patient's SPO2 remained appropriate throughout the procedure. The colonoscope was inserted per rectum and advanced under direct vision to the cecum with mild difficulty due to tortuosity and severe diverticulosis in the sigmoid. Findings:  Cecum/Ascending colon: normal    Transverse colon: normal    Descending/Sigmoid colon: severe diverticulosis in the sigmoid colon. No active inflammation. Rectum/Anus: examined in normal and retroflexed positions and was positive for internal and external hemorrhoids. The colon was decompressed and the scope was removed. The patient tolerated the procedure well. IMPRESSION/PLAN:   1. Diverticulosis and hemorrhoid. Recommend fiber supplementation. Can discuss elective sigmoid colectomy for her recurrent diverticulitis however she has never had an episode of complicated diverticulitis that would push us towards operative intervention. 2. Repeat colonoscopy in 10 years.     Electronically signed by Monty Coronel MD on 2/12/2021 at 11:55 AM

## 2021-02-12 NOTE — H&P
No chief complaint on file. SUBJECTIVE:  HPI: Patient  presents today for evaluation and possible need of EGD/colonoscopy. Patient has had colonoscopy in the past 5 years ago at [facility] which was normal per mirian. She had an EGD 30 years ago. She has a history of GERD. She has been having dark stools with blood in the stools. She was treated for diverticulitis in January. Denies abdominal pain currently. Patient has been experiencing the following symptoms:  Diarrhea and constipation. The patient denies: nausea or vomiting. There is no family history of colon cancer. I have reviewed the patient's(pertinent information to this visit) medical history, family history(scanned in  the Media tab under \"patient questioner\"), social history and review ofsystems with the patient today in the office.          Past Surgical History:   Procedure Laterality Date    CHOLECYSTECTOMY  2015    DILATION AND CURETTAGE OF UTERUS  1988    ENDOSCOPY, COLON, DIAGNOSTIC      HYSTERECTOMY  12/2/15    robotic assisted LAVH BSO    NASAL SEPTUM SURGERY  1992       Past Medical History:   Diagnosis Date    Diverticular disease     has had 2 episodes of diverticulitis    Irritable bowel        Family History   Problem Relation Age of Onset    Arthritis Mother     Diabetes Father     High Blood Pressure Sister        Social History     Socioeconomic History    Marital status:      Spouse name: Not on file    Number of children: Not on file    Years of education: Not on file    Highest education level: Not on file   Occupational History    Not on file   Social Needs    Financial resource strain: Not on file    Food insecurity     Worry: Not on file     Inability: Not on file    Transportation needs     Medical: Not on file     Non-medical: Not on file   Tobacco Use    Smoking status: Current Every Day Smoker     Packs/day: 2.00    Smokeless tobacco: Never Used   Substance and Sexual Activity    Alcohol use: No    Drug use: No    Sexual activity: Yes     Partners: Female   Lifestyle    Physical activity     Days per week: Not on file     Minutes per session: Not on file    Stress: Not on file   Relationships    Social connections     Talks on phone: Not on file     Gets together: Not on file     Attends Scientologist service: Not on file     Active member of club or organization: Not on file     Attends meetings of clubs or organizations: Not on file     Relationship status: Not on file    Intimate partner violence     Fear of current or ex partner: Not on file     Emotionally abused: Not on file     Physically abused: Not on file     Forced sexual activity: Not on file   Other Topics Concern    Not on file   Social History Narrative    Not on file       Current Facility-Administered Medications   Medication Dose Route Frequency Provider Last Rate Last Admin    lactated ringers infusion   Intravenous Continuous Jinger Chiquito, APRN - CNP 75 mL/hr at 02/12/21 0919 New Bag at 02/12/21 0919    sodium chloride flush 0.9 % injection 10 mL  10 mL Intravenous 2 times per day Jinger Chiquito, APRN - CNP        sodium chloride flush 0.9 % injection 10 mL  10 mL Intravenous PRN Jinger Chiquito, APRN - CNP            Allergies   Allergen Reactions    Lac Bovis Other (See Comments)     Unknown    Other Other (See Comments)     Grass - Unknown    Zithromax [Azithromycin] Hives    Augmentin [Amoxicillin-Pot Clavulanate] Nausea And Vomiting    Oxycodone Nausea And Vomiting    Percocet [Oxycodone-Acetaminophen] Nausea And Vomiting    Vicodin [Hydrocodone-Acetaminophen] Nausea And Vomiting       Review of Systems:       Review of Systems   Constitutional: Negative for chills. Negative for fever. HENT: Negative for congestion. Negative for rhinorrhea. Respiratory: Negative for cough. Negative for shortness of breath. Negative for wheezing.     Cardiovascular: Negative for chest pain.   Gastrointestinal: as per HPI  Genitourinary: Negative for difficulty urinating. Neurological: Negative for dizziness, syncope and numbness. Hematological: Does not bruise/bleed easily. OBJECTIVE:  Physical Exam:    BP (!) 102/59   Pulse 58   Temp 97.7 °F (36.5 °C) (Temporal)   Resp 16   Ht 5' 4\" (1.626 m)   Wt 137 lb (62.1 kg)   LMP 11/30/2013 (Within Years) Comment: states a year and a half ago  SpO2 97%   BMI 23.52 kg/m²      Physical Exam  General: awake, alert, in no acute distress  HEENT: mucous membranes moist  Respiratory: normal effort, no wheezes appreciated  CV: appears well perfused, regular rate and rhythm  Abdomen: Soft, non-tender, non-distended. No guarding or rebound tenderness. Skin: warm and dry  Extremities: atraumatic  Neuro: no focal deficits noted  Psych: mood normal        ASSESSMENT:  Blood in the stools, recent diverticulitis. PLAN:  Treatment:    -Will proceed with EGD and colonoscopy. -Reviewed in detail with the patient and/or family the expected pre-operative, operative, and post-operative courses including risks, benefits, and alternatives to the procedure. The patient's questions were answered in detail and agreed to proceed with the procedure. Lisseth Best MD    The patient was counseled at length about the risks of raleigh Covid-19 during their perioperative period and any recovery window from their procedure. The patient was made aware that raleigh Covid-19  may worsen their prognosis for recovering from their procedure  and lend to a higher morbidity and/or mortality risk. All material risks, benefits, and reasonable alternatives including postponing the procedure were discussed. The patient does wish to proceed with the procedure at this time.

## 2021-02-12 NOTE — FLOWSHEET NOTE
Returned to room Q1. Report received from [address] . Alert and oriented. Respirations even and unlabored. Color pink. Abdomen soft and nontender. Beverage provided. Call light in reach.

## 2021-02-22 ENCOUNTER — TELEPHONE (OUTPATIENT)
Dept: GASTROENTEROLOGY | Age: 56
End: 2021-02-22

## 2021-02-22 ENCOUNTER — TELEMEDICINE (OUTPATIENT)
Dept: GASTROENTEROLOGY | Age: 56
End: 2021-02-22
Payer: COMMERCIAL

## 2021-02-22 DIAGNOSIS — K57.30 DIVERTICULOSIS LARGE INTESTINE W/O PERFORATION OR ABSCESS W/O BLEEDING: ICD-10-CM

## 2021-02-22 DIAGNOSIS — K58.2 IRRITABLE BOWEL SYNDROME WITH BOTH CONSTIPATION AND DIARRHEA: ICD-10-CM

## 2021-02-22 DIAGNOSIS — K29.50 CHRONIC GASTRITIS WITHOUT BLEEDING, UNSPECIFIED GASTRITIS TYPE: Primary | ICD-10-CM

## 2021-02-22 PROBLEM — K27.9 PUD (PEPTIC ULCER DISEASE): Status: RESOLVED | Noted: 2021-01-13 | Resolved: 2021-02-22

## 2021-02-22 PROCEDURE — 99212 OFFICE O/P EST SF 10 MIN: CPT | Performed by: NURSE PRACTITIONER

## 2021-02-22 PROCEDURE — 4004F PT TOBACCO SCREEN RCVD TLK: CPT | Performed by: NURSE PRACTITIONER

## 2021-02-22 PROCEDURE — 3017F COLORECTAL CA SCREEN DOC REV: CPT | Performed by: NURSE PRACTITIONER

## 2021-02-22 PROCEDURE — G8484 FLU IMMUNIZE NO ADMIN: HCPCS | Performed by: NURSE PRACTITIONER

## 2021-02-22 PROCEDURE — G8427 DOCREV CUR MEDS BY ELIG CLIN: HCPCS | Performed by: NURSE PRACTITIONER

## 2021-02-22 PROCEDURE — G8420 CALC BMI NORM PARAMETERS: HCPCS | Performed by: NURSE PRACTITIONER

## 2021-02-22 NOTE — PROGRESS NOTES
2021    TELEHEALTH EVALUATION -- Audio/Visual (During DJWDS-72 public health emergency)    HPI:    Aparna Butler (:  1965) has requested an audio/video evaluation for the following concern(s): Follow-up on EGD/colonoscopy. Her EGD showed normal appearing esophagus, duodenum and mild antral gastritis. Her stomach biopsies showed normal tissue with no evidence of H. Pylori infection. Her colonoscopy revealed severe sigmoid diverticulosis, internal and external hemorrhoids. She reports feeling good. Her bowel pattern has normalized. She is having daily soft brown formed stools. Her typical bowel pattern fluctuates with diarrhea and constipation. Metamucil helps and takes once daily. Eating salads causes diarrhea. She had a minimal amount of bleeding after her colonoscopy. No current blood in her stool or black tarry stools. No excess belching or excess flatulence. Her appetite is fair and weight is stable. No nausea or vomiting. No abdominal pain, bloating or distention. Her heartburn is controlled with taking Prilosec. No nocturnal awakenings with acid reflux. No dysphagia or pain with swallowing. No family history of colon or stomach cancer.       ROS  Review of Systems   Constitutional: Positive for appetite change. Negative for chills, diaphoresis, fatigue and fever. HENT: Negative for ear pain, hearing loss and tinnitus. Eyes: Negative for photophobia, pain and visual disturbance. Respiratory: Negative for cough, shortness of breath and wheezing. Cardiovascular: Negative for chest pain, palpitations and leg swelling. Gastrointestinal: Positive for constipation. Negative for abdominal pain, blood in stool, diarrhea, nausea and vomiting. Endocrine: Negative for cold intolerance, heat intolerance and polydipsia. Genitourinary: Positive for frequency. Negative for dysuria and urgency. Musculoskeletal: Positive for back pain. Negative for myalgias and neck pain. Eyes:  EOM    [x]  Normal  [] Abnormal-  Sclera  []  Normal  [] Abnormal -         Discharge []  None visible  [] Abnormal -    HENT:   [x] Normocephalic, atraumatic. [] Abnormal   [] Mouth/Throat: Mucous membranes are moist.     External Ears [x] Normal  [] Abnormal-     Neck: [x] No visualized mass     Pulmonary/Chest: [x] Respiratory effort normal.  [x] No visualized signs of difficulty breathing or respiratory distress        [] Abnormal-      Musculoskeletal:   [x] Normal gait with no signs of ataxia         [x] Normal range of motion of neck        [] Abnormal-       Neurological:        [x] No Facial Asymmetry (Cranial nerve 7 motor function) (limited exam to video visit)          [] No gaze palsy        [] Abnormal-         Skin:        [x] No significant exanthematous lesions or discoloration noted on facial skin         [] Abnormal-            Psychiatric:       [x] Normal Affect [] No Hallucinations        [] Abnormal-       ASSESSMENT/PLAN:  1. Gastritis possibly due to heartburn vs. dyspepsia without dysphagia or odynophagia. The patient was encouraged to take Prilosec 40 mg every other day and may consider weaning off at next visit unless her symptoms worsen. Her EGD showed normal esophagus and mild antral gastritis with no evidence of H. Pylori infection. The patient was encouraged to continue with anti-reflux measures and avoid foods that trigger. 2.  Sigmoid diverticulosis without bleeding. Her colonoscopy showed severe sigmoid diverticulosis. Her first attack of diverticulitis was in 5- noted on CT of the abdomen/pelvis. She was treated for diverticulitis a month ago. The patient was encouraged to continue with a high fiber diet. Recommend good bowel regimen with an increase in fruit, fiber and fluids to prevent constipation.   May consider surgical consultation if patient continues to have attacks of complicated diverticulitis (no current evidence of abscess or narrowing of the colon). 3.  IBS with mix of diarrhea/constipation. Her bowel pattern is stable at this time. The patient was encouraged to continue taking Metamucil daily. Recommend increasing her fruit, fiber and fluids. Avoid foods that worsen i.e.. fatty foods, greasy foods, sugar, highly refined carbohydrates etc.  4.  The patient was encouraged to follow-up in 2 months. Ethan Lennon is a 48 y.o. female being evaluated by a Virtual Visit (video visit) encounter to address concerns as mentioned above. A caregiver was present when appropriate. Due to this being a TeleHealth encounter (During WWKEZ-51 public health emergency), evaluation of the following organ systems was limited: Vitals/Constitutional/EENT/Resp/CV/GI//MS/Neuro/Skin/Heme-Lymph-Imm. Pursuant to the emergency declaration under the [address] [place] [address], [address] [place] [address] authority and the [employer] and [employer] Act, this Virtual Visit was conducted with patient's (and/or legal guardian's) consent, to reduce the patient's risk of exposure to COVID-19 and provide necessary medical care. The patient (and/or legal guardian) has also been advised to contact this office for worsening conditions or problems, and seek emergency medical treatment and/or call 911 if deemed necessary. Patient identification was verified at the start of the visit: Yes she verified her full name and date of birth. Total time spent on this encounter: 15 minutes. Services were provided through a video synchronous discussion virtually to substitute for in-person clinic visit. Patient and provider were located at their individual homes. --FABRICE Krishnamurthy CNP on 2/22/2021 at 11:12 AM    An electronic signature was used to authenticate this note.

## 2021-03-05 ENCOUNTER — VIRTUAL VISIT (OUTPATIENT)
Dept: GASTROENTEROLOGY | Age: 56
End: 2021-03-05

## 2021-03-05 DIAGNOSIS — K29.70 GASTRITIS WITHOUT BLEEDING, UNSPECIFIED CHRONICITY, UNSPECIFIED GASTRITIS TYPE: ICD-10-CM

## 2021-03-05 DIAGNOSIS — R11.2 ACUTE NAUSEA WITH NONBILIOUS VOMITING: Primary | ICD-10-CM

## 2021-03-05 DIAGNOSIS — Z87.19 HISTORY OF DIVERTICULITIS: ICD-10-CM

## 2021-03-05 DIAGNOSIS — K58.2 IRRITABLE BOWEL SYNDROME WITH BOTH CONSTIPATION AND DIARRHEA: ICD-10-CM

## 2021-03-05 PROCEDURE — 99212 OFFICE O/P EST SF 10 MIN: CPT | Performed by: NURSE PRACTITIONER

## 2021-03-05 PROCEDURE — 4004F PT TOBACCO SCREEN RCVD TLK: CPT | Performed by: NURSE PRACTITIONER

## 2021-03-05 PROCEDURE — G8484 FLU IMMUNIZE NO ADMIN: HCPCS | Performed by: NURSE PRACTITIONER

## 2021-03-05 PROCEDURE — G8427 DOCREV CUR MEDS BY ELIG CLIN: HCPCS | Performed by: NURSE PRACTITIONER

## 2021-03-05 PROCEDURE — 3017F COLORECTAL CA SCREEN DOC REV: CPT | Performed by: NURSE PRACTITIONER

## 2021-03-05 PROCEDURE — G8420 CALC BMI NORM PARAMETERS: HCPCS | Performed by: NURSE PRACTITIONER

## 2021-03-05 RX ORDER — ONDANSETRON 4 MG/1
4 TABLET, FILM COATED ORAL DAILY PRN
Qty: 30 TABLET | Refills: 2 | Status: SHIPPED | OUTPATIENT
Start: 2021-03-05 | End: 2022-06-21

## 2021-03-05 NOTE — PROGRESS NOTES
Whit Pritchard is a 48 y.o. female evaluated via telephone on 3/5/2021. Consent:  She is aware that that she may receive a bill for this telephone service, depending on her insurance coverage, and has provided verbal consent to proceed: Yes she agreed to the telephone visit. Documentation:  I communicated with the patient about follow-up on acute episode of nausea and vomiting. She mentioned she had three episodes of nausea and vomiting after her colonoscopy. She recalled worse after eating pancakes with butter on them or if she over eats. She endorses lactose intolerance. Her nausea has improved a little today. No current vomiting. Her appetite is good and weight is stable. No abdominal pain, bloating or distention.  Her heartburn and acid reflux is controlled with taking Prilosec every other day. No nocturnal awakenings with acid reflux.  No dysphagia or pain with swallowing. No excess belching or flatulence. Her EGD and colonoscopy were done by Dr. Km Mora on 2-. Her EGD showed normal appearing esophagus, duodenum and mild antral gastritis. Her stomach biopsies showed normal tissue with no evidence of H. Pylori infection. Her colonoscopy revealed severe sigmoid diverticulosis, internal external hemorrhoids. She is having daily soft brown formed stools. No current diarrhea or constipation. She is taking Metamucil daily. No blood in her stool or black tarry stools.  No family history of colon or stomach cancer.       ROS  Review of Systems   Constitutional: Positive for appetite change. Negative for chills, diaphoresis, fatigue and fever. HENT: Negative for ear pain, hearing loss and tinnitus.    Eyes: Negative for photophobia, pain and visual disturbance. Respiratory: Negative for cough, shortness of breath and wheezing.    Cardiovascular: Negative for chest pain, palpitations and leg swelling.    Gastrointestinal: Negative for abdominal pain, blood in stool, diarrhea, nausea and vomiting. Endocrine: Negative for cold intolerance, heat intolerance and polydipsia. Genitourinary: Positive for frequency. Negative for dysuria and urgency. Musculoskeletal: Positive for back pain. Negative for myalgias and neck pain. Skin: Negative for color change, pallor and rash. Allergic/Immunologic: Positive for food allergies (Milk). Negative for environmental allergies. Neurological: Negative for dizziness, seizures, weakness and headaches. Hematological: Bruises/bleeds easily. Psychiatric/Behavioral: Positive for dysphoric mood. Negative for sleep disturbance and suicidal ideas. The patient is not nervous/anxious.      Physical Exam  Unable to obtain vitals signs due to telephone visit. Constitutional:       General: She is not in acute distress. HENT:      Head: Normocephalic and atraumatic. Neck:      Musculoskeletal: Normal range of motion. Pulmonary:      Effort: Pulmonary effort is normal. No respiratory distress. Musculoskeletal: Normal range of motion. Skin:     General: Skin is warm and dry. Neurological:      Mental Status: She is alert and oriented to person, place, and time. Psychiatric:         Mood and Affect: Mood normal.      ASSESSMENT/PLAN:  1.  Acute episode of nausea with vomiting most likely from diet,overeating or gastritis. Will order Zofran 4 mg as needed for nausea. The patient was encouraged to take Prilosec 40 mg daily. Recommend avoidance of NSAID's. Her recent EGD showed normal esophagus and mild antral gastritis with no evidence of H. Pylori infection. The patient was encouraged to continue with anti-reflux measures and avoid foods that trigger (lactose). 2.  Sigmoid diverticulosis without bleeding. Her colonoscopy showed severe sigmoid diverticulosis. No current abdominal pain. Her first attack of diverticulitis was in 5- noted on CT of the abdomen/pelvis. She was treated for diverticulitis a month ago.  The patient was encouraged to continue with a high fiber diet.  Recommend good bowel regimen with an increase in fruit, fiber and fluids to prevent constipation. May consider surgical consultation if patient continues to have attacks of complicated diverticulitis (no current evidence of abscess or narrowing of the colon). 3.  IBS with mix of diarrhea/constipation.  Her bowel pattern is stable at this time.  The patient was encouraged to continue taking Metamucil daily.  Recommend increasing her fruit, fiber and fluids.  Avoid foods that worsen i.e. fatty foods, greasy foods, sugar, highly refined carbohydrates etc.  4.  The patient was encouraged to follow-up in 2 months.       I affirm this is a Patient Initiated Episode with a Patient who has not had a related appointment within my department in the past 7 days or scheduled within the next 24 hours. Patient identification was verified at the start of the visit: Yes she verified her full name and date of birth. Total Time: 15 minutes. The visit was conducted pursuant to the emergency declaration under the [address] [place] [address], [address] [place] [address] authority and the [employer] and [employer] General Act. Patient identification was verified, and a caregiver was present when appropriate. The patient was located in a state where the provider was credentialed to provide care.     Note: not billable if this call serves to triage the patient into an appointment for the relevant concern      Soledad White

## 2021-09-30 ENCOUNTER — TELEPHONE (OUTPATIENT)
Dept: FAMILY MEDICINE CLINIC | Age: 56
End: 2021-09-30

## 2021-09-30 NOTE — TELEPHONE ENCOUNTER
Pt stated the hemorrhoids have improved and is no longer experiencing bleeding. Pt scheduled to see dr. Rudolph Hernandez 10/14/21 for leg and foot cramps, headaches.

## 2021-09-30 NOTE — TELEPHONE ENCOUNTER
----- Message from Chelly Persaud sent at 9/28/2021  9:03 AM EDT -----  Subject: Message to Provider    QUESTIONS  Information for Provider? patient still has the hemorrhoids and still with   a significant amount of bleeding when she uses the bathroom. She has been   using Tucks and preparation H but doesnt help for very long. Now is having   foot and leg cramps at night and has had a bad headache for he last 3   days. Please contact patient to schedule appt.   ---------------------------------------------------------------------------  --------------  CALL BACK INFO  What is the best way for the office to contact you? OK to leave message on   voicemail  Preferred Call Back Phone Number? [phone]  ---------------------------------------------------------------------------  --------------  SCRIPT ANSWERS  Relationship to Patient? Self  (Is the patient requesting to be seen urgently for their symptoms?)? No  Is this follow up request related to routine Diabetes Management? No  Are you having any new concerns about your existing condition?  Yes

## 2021-12-15 ENCOUNTER — OFFICE VISIT (OUTPATIENT)
Dept: FAMILY MEDICINE CLINIC | Age: 56
End: 2021-12-15
Payer: COMMERCIAL

## 2021-12-15 ENCOUNTER — HOSPITAL ENCOUNTER (OUTPATIENT)
Age: 56
Setting detail: SPECIMEN
Discharge: HOME | End: 2021-12-15
Payer: COMMERCIAL

## 2021-12-15 VITALS
TEMPERATURE: 97.8 F | RESPIRATION RATE: 16 BRPM | OXYGEN SATURATION: 99 % | HEART RATE: 73 BPM | WEIGHT: 135 LBS | BODY MASS INDEX: 23.05 KG/M2 | HEIGHT: 64 IN

## 2021-12-15 DIAGNOSIS — R68.89 FLU-LIKE SYMPTOMS: Primary | ICD-10-CM

## 2021-12-15 DIAGNOSIS — H66.91 RIGHT OTITIS MEDIA, UNSPECIFIED OTITIS MEDIA TYPE: ICD-10-CM

## 2021-12-15 DIAGNOSIS — Z87.19 HX OF DIVERTICULITIS OF COLON: ICD-10-CM

## 2021-12-15 PROCEDURE — 4004F PT TOBACCO SCREEN RCVD TLK: CPT | Performed by: NURSE PRACTITIONER

## 2021-12-15 PROCEDURE — G8427 DOCREV CUR MEDS BY ELIG CLIN: HCPCS | Performed by: NURSE PRACTITIONER

## 2021-12-15 PROCEDURE — 99213 OFFICE O/P EST LOW 20 MIN: CPT | Performed by: NURSE PRACTITIONER

## 2021-12-15 PROCEDURE — G8484 FLU IMMUNIZE NO ADMIN: HCPCS | Performed by: NURSE PRACTITIONER

## 2021-12-15 PROCEDURE — 3017F COLORECTAL CA SCREEN DOC REV: CPT | Performed by: NURSE PRACTITIONER

## 2021-12-15 PROCEDURE — G8420 CALC BMI NORM PARAMETERS: HCPCS | Performed by: NURSE PRACTITIONER

## 2021-12-15 PROCEDURE — U0005 INFEC AGEN DETEC AMPLI PROBE: HCPCS

## 2021-12-15 PROCEDURE — U0003 INFECTIOUS AGENT DETECTION BY NUCLEIC ACID (DNA OR RNA); SEVERE ACUTE RESPIRATORY SYNDROME CORONAVIRUS 2 (SARS-COV-2) (CORONAVIRUS DISEASE [COVID-19]), AMPLIFIED PROBE TECHNIQUE, MAKING USE OF HIGH THROUGHPUT TECHNOLOGIES AS DESCRIBED BY CMS-2020-01-R: HCPCS

## 2021-12-15 RX ORDER — AMOXICILLIN AND CLAVULANATE POTASSIUM 875; 125 MG/1; MG/1
1 TABLET, FILM COATED ORAL 2 TIMES DAILY
Qty: 20 TABLET | Refills: 0 | Status: SHIPPED | OUTPATIENT
Start: 2021-12-15 | End: 2021-12-17 | Stop reason: SINTOL

## 2021-12-15 NOTE — PATIENT INSTRUCTIONS
Your COVID 19 test can take 1-5 days for the results to come back. We ask that you make a Mychart page and view your test results this way. You will need to Self quarantine until you know your results. Increase fluids and rest  Saline nasal spray as needed for nasal congestion  Warm salt gargles as needed for throat discomfort  Monitor temperature twice a day  Tylenol as needed for fevers and/or discomfort. Big deep breaths periodically throughout the day  Regular Mucinex over the counter as needed for chest congestion  If symptoms worsen -Go to the ER. Follow up with your primary care provider      To Whom it May Concern:    Karena Lozano was tested for COVID-19 12/15/2021. He/she must stay home until test results are back. If test is positive, he/she must quarantine for a total of 10 days starting from day one of symptom onset. He/she must also be fever-free for 24 hours at that time, and also have improvement in symptoms. We do not recommend retesting as patients may continue to test positive for months even though no longer contagious. It is suggested you call [address] or [address] with any questions regarding quarantine timeframe/return to work/school details. Steps to help prevent the spread of COVID-19 if you are sick  SOURCE - https://bree-york.info/. html     Stay home except to get medical care   ; Stay home: People who are mildly ill with COVID-19 are able to isolate at home during their illness. You should restrict activities outside your home, except for getting medical care.   ; Avoid public areas: Do not go to work, school, or public areas.   ; Avoid public transportation: Avoid using public transportation, ride-sharing, or taxis.  ; Separate yourself from other people and animals in your home   ; Stay away from others: As much as possible, you should stay in a specific room and away from other people in your home.  Also, you should use a separate bathroom, if available.   ; Limit contact with pets & animals: You should restrict contact with pets and other animals while you are sick with COVID-19, just like you would around other people. Although there have not been reports of pets or other animals becoming sick with COVID-19, it is still recommended that people sick with COVID-19 limit contact with animals until more information is known about the virus. ; When possible, have another member of your household care for your animals while you are sick. If you are sick with COVID-19, avoid contact with your pet, including petting, snuggling, being kissed or licked, and sharing food. If you must care for your pet or be around animals while you are sick, wash your hands before and after you interact with pets and wear a facemask. See COVID-19 and Animals for more information. Other considerations   The ill person should eat/be fed in their room if possible. Non-disposable  items used should be handled with gloves and washed with hot water or in a . Clean hands after handling used  items.  If possible, dedicate a lined trash can for the ill person. Use gloves when removing garbage bags, handling, and disposing of trash. Wash hands after handling or disposing of trash.  Consider consulting with your local health department about trash disposal guidance if available. Information for Household Members and Caregivers of Someone who is Sick   Call ahead before visiting your doctor   Call ahead: If you have a medical appointment, call the healthcare provider and tell them that you have or may have COVID-19. This will help the healthcare provider's office take steps to keep other people from getting infected or exposed. Wear a facemask if you are sick   ;  If you are sick: You should wear a facemask when you are around other people (e.g., sharing a room or vehicle) or pets and before you enter a healthcare provider's office. ; If you are caring for others: If the person who is sick is not able to wear a facemask (for example, because it causes trouble breathing), then people who live with the person who is sick should not stay in the same room with them, or they should wear a facemask if they enter a room with the person who is sick. Cover your coughs and sneezes   ; Cover: Cover your mouth and nose with a tissue when you cough or sneeze.   ; Dispose: Throw used tissues in a lined trash can.   ; Wash hands: Immediately wash your hands with soap and water for at least 20 seconds or, if soap and water are not available, clean your hands with an alcohol-based hand  that contains at least 60% alcohol. Clean your hands often   ; Wash hands: Wash your hands often with soap and water for at least 20 seconds, especially after blowing your nose, coughing, or sneezing; going to the bathroom; and before eating or preparing food.   ; Hand : If soap and water are not readily available, use an alcohol-based hand  with at least 60% alcohol, covering all surfaces of your hands and rubbing them together until they feel dry.   ; Soap and water: Soap and water are the best option if hands are visibly dirty.   ; Avoid touching: Avoid touching your eyes, nose, and mouth with unwashed hands. Handwashing Tips   ; Wet your hands with clean, running water (warm or cold), turn off the tap, and apply soap.  ; Lather your hands by rubbing them together with the soap. Lather the backs of your hands, between your fingers, and under your nails. ; Scrub your hands for at least 20 seconds. Need a timer? Hum [place] from beginning to end twice.  ; Rinse your hands well under clean, running water.  ; Dry your hands using a clean towel or air dry them. Avoid sharing personal household items   ; Do not share:  You should not share dishes, drinking glasses, cups, eating utensils, towels, or bedding with other people or pets in your home.   ; Wash thoroughly after use: After using these items, they should be washed thoroughly with soap and water. Clean all high-touch surfaces everyday   ; Clean and disinfect: Practice routine cleaning of high touch surfaces.  ; High touch surfaces include counters, tabletops, doorknobs, bathroom fixtures, toilets, phones, keyboards, tablets, and bedside tables.  ; Disinfect areas with bodily fluids: Also, clean any surfaces that may have blood, stool, or body fluids on them.   ; Household : Use a household cleaning spray or wipe, according to the label instructions. Labels contain instructions for safe and effective use of the cleaning product including precautions you should take when applying the product, such as wearing gloves and making sure you have good ventilation during use of the product. Monitor your symptoms   Seek medical attention: Seek prompt medical attention if your illness is worsening     (e.g., difficulty breathing).   ; Call your doctor: Before seeking care, call your healthcare provider and tell them that you have, or are being evaluated for, COVID-19.   ; Wear a facemask when sick: Put on a facemask before you enter the facility. These steps will help the healthcare provider's office to keep other people in the office or waiting room from getting infected or exposed. ; Alert health department: Ask your healthcare provider to call the local or state health department. Persons who are placed under active monitoring or facilitated self-monitoring should follow instructions provided by their local health department or occupational health professionals, as appropriate.  ; Call 911 if you have a medical emergency: If you have a medical emergency and need to call 911, notify the dispatch personnel that you have, or are being evaluated for COVID-19. If possible, put on a facemask before emergency medical services arrive.     Patient Education        Diverticulitis: Care Instructions  Overview     Diverticulitis occurs when pouches form in the wall of the colon and become inflamed or infected. It can be very painful. Doctors aren't sure what causes diverticulitis. There is no proof that foods such as nuts, seeds, or berries cause it or make it worse. A low-fiber diet may cause the colon to work harder to push stool forward. Pouches may form because of this extra work. It may be hard to think about healthy eating while you're in pain. But as you recover, you might think about how you can use healthy eating for overall better health. Healthy eating may help you avoid future attacks. Follow-up care is a key part of your treatment and safety. Be sure to make and go to all appointments, and call your doctor if you are having problems. It's also a good idea to know your test results and keep a list of the medicines you take. How can you care for yourself at home? · Drink plenty of fluids. If you have kidney, heart, or liver disease and have to limit fluids, talk with your doctor before you increase the amount of fluids you drink. · Stay with liquids or a bland diet (plain rice, bananas, dry toast or crackers, applesauce) until you are feeling better. Then you can return to regular foods and slowly increase the amount of fiber in your diet. · Use a heating pad set on low on your belly to relieve mild cramps and pain. · Get extra rest until you are feeling better. · Be safe with medicines. Read and follow all instructions on the label. ? If the doctor gave you a prescription medicine for pain, take it as prescribed. ? If you are not taking a prescription pain medicine, ask your doctor if you can take an over-the-counter medicine. · If your doctor prescribed antibiotics, take them as directed. Do not stop taking them just because you feel better. You need to take the full course of antibiotics.   · Do not use laxatives or enemas unless your doctor tells you to use them. When should you call for help? Call your doctor now or seek immediate medical care if:    · You have a fever.     · You are vomiting.     · You have new or worse belly pain.     · You cannot pass stools or gas. Watch closely for changes in your health, and be sure to contact your doctor if you have any problems. Where can you learn more? Go to https://Maozhao.authorGEN. org and sign in to your [employer] account. Enter H901 in the [employer]here box to learn more about \"Diverticulitis: Care Instructions. \"     If you do not have an account, please click on the \"Sign Up Now\" link. Current as of: February 10, 2021               Content Version: 13.0  © 8301-3202 Healthwise, Incorporated. Care instructions adapted under license by [facility] ([facility]). If you have questions about a medical condition or this instruction, always ask your healthcare professional. [place] [address] any warranty or liability for your use of this information.

## 2021-12-15 NOTE — PROGRESS NOTES
12/15/21  Bennett Hanson  1965    FLU/COVID-19 CLINIC EVALUATION    HPI SYMPTOMS:    Employer: [employer]    [] Fevers  [] Chills  [] Cough  [] Coughing up blood  [x] Chest Congestion  [x] Nasal Congestion  [] Feeling short of breath  [] Sometimes  [] Frequently  [] All the time  [x] Chest pain  [x] Headaches  [x]Tolerable  [] Severe  [x] Sore throat  [x] Muscle aches  [] Nausea  [] Vomiting  []Unable to keep fluids down  [x] Diarrhea  []Severe    [x] OTHER SYMPTOMS:  fatigue    Symptom Duration:   [] 1  [] 2   [] 3   [] 4    [] 5   [] 6   [] 7   [] 8   [] 9   [x] 10   [] 11   [] 12   [] 13   [] 14   [] Longer than 14 days    Symptom course:   [] Worsening     [] Stable     [] Improving    RISK FACTORS:    [] Pregnant or possibly pregnant  [] Age over 61  [] Diabetes  [] Heart disease  [] Asthma  [] COPD/Other chronic lung diseases  [] Active Cancer  [] On Chemotherapy  [] Taking oral steroids  [] History Lymphoma/Leukemia  [] Close contact with a lab confirmed COVID-19 patient within 14 days of symptom onset  [] History of travel from affected geographical areas within 14 days of symptom onset       VITALS:  There were no vitals filed for this visit. TESTS:    POCT FLU:  [] Positive     []Negative    ASSESSMENT:    [] Flu  [] Possible COVID-19  [] Strep    PLAN:    [] Discharge home with written instructions for:  [] Flu management  [] Possible COVID-19 infection with self-quarantine and management of symptoms  [] Follow-up with primary care physician or emergency department if worsens  [] Evaluation per physician/NP/PA in clinic  [] Sent to ER       An  electronic signature was used to authenticate this note.      --Leslie Burnette on 12/15/2021 at 8:31 AM

## 2021-12-15 NOTE — PROGRESS NOTES
12/15/2021    HPI:  Chief complaint and history of present illness as per medical assistant/nurse documented today in the Flu/COVID-19 clinic. She is here today with c/o 11 day history of chest and nasal congestion, bilateral otalgia, chest tightness with cough, tolerable headache, muscle aches, fatigue, and intermittent diarrhea. States that her wife is positive for COVID and the patient was tested at [facility] last Wednesday and was negative. She is requesting to be re-tested for work since she has been off sick. She also states \"I feel like I have some diverticulitis issues starting, some lower abdominal pain. Denies fever, chills, n/v, bloody stools, dysuria, urinary frequency. MEDICATIONS:  Prior to Visit Medications    Medication Sig Taking? Authorizing Provider   amoxicillin-clavulanate (AUGMENTIN) 875-125 MG per tablet Take 1 tablet by mouth 2 times daily for 10 days Yes FABRICE Cortes CNP   ondansetron (ZOFRAN) 4 MG tablet Take 1 tablet by mouth daily as needed for Nausea or Vomiting  FABRICE Wynne CNP   omeprazole (PRILOSEC) 40 MG delayed release capsule Take 1 capsule by mouth every morning (before breakfast)  FABRICE Wynne CNP   Psyllium (METAMUCIL SMOOTH TEXTURE) 28.3 % POWD Take by mouth 1 tablespoon with 8 ounces of water every day. OTC  Historical Provider, MD   guaiFENesin (MUCINEX MAXIMUM STRENGTH) 1200 MG TB12 Take by mouth 1 tablet daily. OTC  Historical Provider, MD   Probiotic Product (PROBIOTIC DAILY PO) Take 1 tablet by mouth daily  Historical Provider, MD   dextromethorphan-guaiFENesin (MUCINEX DM)  MG per SR tablet Take 1 tablet by mouth every 12 hours as needed. Historical Provider, MD           PHYSICAL EXAM:  Physical Exam  Vitals and nursing note reviewed. Constitutional:       General: She is not in acute distress. Appearance: Normal appearance. She is well-developed. She is not ill-appearing, toxic-appearing or diaphoretic.    HENT: Head: Normocephalic and atraumatic. Right Ear: Tympanic membrane, ear canal and external ear normal.      Left Ear: Tympanic membrane, ear canal and external ear normal.      Nose: Congestion present. Mouth/Throat:      Mouth: Mucous membranes are moist.      Pharynx: No oropharyngeal exudate or posterior oropharyngeal erythema. Eyes:      Extraocular Movements: Extraocular movements intact. Conjunctiva/sclera: Conjunctivae normal.      Pupils: Pupils are equal, round, and reactive to light. Neck:      Thyroid: No thyroid mass or thyromegaly. Trachea: Trachea normal.   Cardiovascular:      Rate and Rhythm: Normal rate and regular rhythm. Pulses: Normal pulses. Heart sounds: S1 normal and S2 normal.   Pulmonary:      Effort: Pulmonary effort is normal. No accessory muscle usage or respiratory distress. Breath sounds: Normal breath sounds. No stridor. No wheezing, rhonchi or rales. Abdominal:      General: Bowel sounds are normal.      Palpations: Abdomen is soft. Tenderness: There is abdominal tenderness in the left lower quadrant. Comments: Slight tenderness with palpation. Musculoskeletal:         General: Normal range of motion. Right shoulder: No swelling, deformity, tenderness, bony tenderness or crepitus. Normal range of motion. Cervical back: Full passive range of motion without pain, normal range of motion and neck supple. No rigidity. No muscular tenderness. Lymphadenopathy:      Cervical: No cervical adenopathy. Skin:     General: Skin is warm and dry. Coloration: Skin is not pale. Findings: No erythema or rash. Nails: There is no clubbing. Neurological:      Mental Status: She is alert and oriented to person, place, and time. Psychiatric:         Mood and Affect: Mood normal.         Speech: Speech normal.         Behavior: Behavior normal.         Thought Content:  Thought content normal.         Judgment: Judgment normal.       Pulse 73   Temp 97.8 °F (36.6 °C) (Infrared)   Resp 16   Ht 5' 4\" (1.626 m)   Wt 135 lb (61.2 kg)   LMP 11/30/2013 (Within Years) Comment: states a year and a half ago  SpO2 99% Comment: RA  BMI 23.17 kg/m²     ASSESSMENT/PLAN:    1. Flu-like symptoms  - Encourage clear fluids without caffeine to ensure hydration.  - Smaller, more frequent meals    - Saline nasal spray, cool mist humidifier, prop head at night for congestion.   - Tylenol as needed for fever, pain. - Counseled on signs of increased work of breathing to seek emergency treatment  - Follow-up with PCP as needed. - Covid-19 Ambulatory    2. Right otitis media, unspecified otitis media type  Take antibiotic as prescribed. Ibuprofen or Tylenol as needed for pain. If no improvement in symptoms in 2-3 days then please give me a call back or return to clinic. Follow up immediately if develops severe/worsening symptoms, high fever, or pain behind the ear at the mastoid bone. - amoxicillin-clavulanate (AUGMENTIN) 875-125 MG per tablet; Take 1 tablet by mouth 2 times daily for 10 days  Dispense: 20 tablet; Refill: 0    3. Hx of diverticulitis of colon  Non toxic in appearance. Denies n/v/ fever. Slight LLQ abdominal discomfort with palpation. Abdomen soft. Discussed that the Augmentin will also cover for if she is beginning to have diverticulitis issues as well. Augmentin is listed as an allergy with symptoms of n/v. Patient states that she does not have rash, anaphylaxis and states that she has been on Augmentin several times with no issues. Advised to seek emergency care if she has an increase in abdominal pain, onset of n/v or fever. She verbalizes understanding. FOLLOW-UP:  Return if symptoms worsen or fail to improve.     In addition to other information, the printed after visit summary provided to the patient includes:  [x] COVID-19 Self care instructions  [x] COVID-19 General patient information

## 2021-12-16 LAB
SARS-COV-2: NOT DETECTED
SOURCE: NORMAL

## 2021-12-17 DIAGNOSIS — H66.40 SUPPURATIVE OTITIS MEDIA, UNSPECIFIED CHRONICITY, UNSPECIFIED LATERALITY: Primary | ICD-10-CM

## 2021-12-17 RX ORDER — CEFDINIR 300 MG/1
300 CAPSULE ORAL 2 TIMES DAILY
Qty: 20 CAPSULE | Refills: 0 | Status: SHIPPED | OUTPATIENT
Start: 2021-12-17 | End: 2021-12-27

## 2021-12-17 NOTE — PROGRESS NOTES
Patient denied having issues with Augmentin during the appointment, reviewed with her that it was listed to cause N/V. She denied. After being home she remembered she does have this side effect and requesting a different antibiotic. Since she has no reaction such as rash, hives, or any other serious reactions I will prescribe Omnicef.

## 2022-06-21 ENCOUNTER — OFFICE VISIT (OUTPATIENT)
Dept: FAMILY MEDICINE CLINIC | Age: 57
End: 2022-06-21
Payer: COMMERCIAL

## 2022-06-21 ENCOUNTER — HOSPITAL ENCOUNTER (OUTPATIENT)
Dept: CT IMAGING | Age: 57
Discharge: HOME | End: 2022-06-21
Payer: COMMERCIAL

## 2022-06-21 VITALS
HEART RATE: 65 BPM | DIASTOLIC BLOOD PRESSURE: 70 MMHG | HEIGHT: 64 IN | SYSTOLIC BLOOD PRESSURE: 114 MMHG | OXYGEN SATURATION: 96 % | BODY MASS INDEX: 22.02 KG/M2 | WEIGHT: 129 LBS

## 2022-06-21 DIAGNOSIS — R10.84 GENERALIZED ABDOMINAL PAIN: ICD-10-CM

## 2022-06-21 DIAGNOSIS — K92.1 HEMATOCHEZIA: Primary | ICD-10-CM

## 2022-06-21 DIAGNOSIS — Z87.19 HISTORY OF DIVERTICULITIS: ICD-10-CM

## 2022-06-21 DIAGNOSIS — M54.50 ACUTE BILATERAL LOW BACK PAIN WITHOUT SCIATICA: ICD-10-CM

## 2022-06-21 DIAGNOSIS — K92.1 HEMATOCHEZIA: ICD-10-CM

## 2022-06-21 PROCEDURE — 4004F PT TOBACCO SCREEN RCVD TLK: CPT | Performed by: PHYSICIAN ASSISTANT

## 2022-06-21 PROCEDURE — 6360000004 HC RX CONTRAST MEDICATION: Performed by: PHYSICIAN ASSISTANT

## 2022-06-21 PROCEDURE — G8427 DOCREV CUR MEDS BY ELIG CLIN: HCPCS | Performed by: PHYSICIAN ASSISTANT

## 2022-06-21 PROCEDURE — 74176 CT ABD & PELVIS W/O CONTRAST: CPT

## 2022-06-21 PROCEDURE — 3017F COLORECTAL CA SCREEN DOC REV: CPT | Performed by: PHYSICIAN ASSISTANT

## 2022-06-21 PROCEDURE — G8420 CALC BMI NORM PARAMETERS: HCPCS | Performed by: PHYSICIAN ASSISTANT

## 2022-06-21 PROCEDURE — 99214 OFFICE O/P EST MOD 30 MIN: CPT | Performed by: PHYSICIAN ASSISTANT

## 2022-06-21 RX ORDER — BACLOFEN 5 MG/1
TABLET ORAL
Qty: 30 TABLET | Refills: 0 | Status: SHIPPED | OUTPATIENT
Start: 2022-06-21

## 2022-06-21 RX ORDER — TRAMADOL HYDROCHLORIDE 50 MG/1
50 TABLET ORAL EVERY 6 HOURS PRN
Qty: 20 TABLET | Refills: 0 | Status: SHIPPED | OUTPATIENT
Start: 2022-06-21 | End: 2022-06-26

## 2022-06-21 RX ADMIN — IOHEXOL 50 ML: 240 INJECTION, SOLUTION INTRATHECAL; INTRAVASCULAR; INTRAVENOUS; ORAL at 19:14

## 2022-06-21 NOTE — PROGRESS NOTES
6/23/2022    [facility]    Chief Complaint   Patient presents with    Rectal Bleeding      patient reports hemmroids and bleeding x3 days rectally after a bowel movement, patient notes she has diverticulitis, taking a probiotic for this as well as a laxative per symptoms, patient reports back pain present and believes this is the cause, patient also reports numbness of the hands       HPI  History obtained from the patient. Danial Lennox is a 65 y.o. female who presents today for back pain, abdominal pain, and blood in stool X 3 days. Patient states that she has flares of diverticulitis ever so often. She takes probiotics daily, which she finds very helpful. Admits constipation last week, so she took a laxative (Senna) on Saturday night and had diarrhea on Sunday. Admits BRBPR X 3 days. Patient has a history of hemorrhoids \"and on days where it's really bad, I feel it out of my body, just hanging there. \" Patient admits generalized abdominal pain. Denies fever, chills. Patient is up-to-date on colonoscopy. Last colonoscopy was February 2021. Patient states that every time she gets a flare of diverticulitis, she gets back pain like she is currently experiencing. Patient notes her back pain has \"gotten really bad in the last two days. .. It's constant now. \" Patient notes the back pain is worse when she feels the urge to make a bowel movement or urinate. Admits an episode where she twisted her back and felt like her legs suddenly felt weak and gave out. \"I caught myself on the table and I felt like I would've fallen if I hadn't been standing right next to a table. \" Patient denies saddle anesthesia, bowel or bladder incontinence, fever and chills. Pt able to walk and bear weight. [place] physically active. With both abdominal and back pain considered, the patient rates her pain as 9 out of 10.     REVIEW OF SYMPTOMS  Review of Systems   Constitutional: Negative for chills and fever.   Respiratory: Negative for cough and shortness of breath. Gastrointestinal: Negative for diarrhea and vomiting. PAST MEDICAL HISTORY  Past Medical History:   Diagnosis Date    Diverticular disease     has had 2 episodes of diverticulitis    Irritable bowel        FAMILY HISTORY  Family History   Problem Relation Age of Onset    Arthritis Mother     Diabetes Father     High Blood Pressure Sister        SOCIAL HISTORY  Social History     Socioeconomic History    Marital status:      Spouse name: None    Number of children: None    Years of education: None    Highest education level: None   Occupational History    None   Tobacco Use    Smoking status: Current Every Day Smoker     Packs/day: 2.00     Types: Cigarettes    Smokeless tobacco: Never Used   Substance and Sexual Activity    Alcohol use: No    Drug use: No    Sexual activity: Yes     Partners: Female   Other Topics Concern    None   Social History Narrative    None     Social Determinants of Health     Financial Resource Strain:     Difficulty of Paying Living Expenses: Not on file   Food Insecurity:     Worried About Running Out of Food in the Last Year: Not on file    Betito of Food in the Last Year: Not on file   Transportation Needs:     Lack of Transportation (Medical): Not on file    Lack of Transportation (Non-Medical):  Not on file   Physical Activity:     Days of Exercise per Week: Not on file    Minutes of Exercise per Session: Not on file   Stress:     Feeling of Stress : Not on file   Social Connections:     Frequency of Communication with Friends and Family: Not on file    Frequency of Social Gatherings with Friends and Family: Not on file    Attends Rastafarian Services: Not on file    Active Member of Clubs or Organizations: Not on file    Attends Club or Organization Meetings: Not on file    Marital Status: Not on file   Intimate Partner Violence:     Fear of Current or Ex-Partner: Not on file  Emotionally Abused: Not on file    Physically Abused: Not on file    Sexually Abused: Not on file   Housing Stability:     Unable to Pay for Housing in the Last Year: Not on file    Number of Places Lived in the Last Year: Not on file    Unstable Housing in the Last Year: Not on file        SURGICAL HISTORY  Past Surgical History:   Procedure Laterality Date    CHOLECYSTECTOMY  2015    COLONOSCOPY N/A 2/12/2021    COLONOSCOPY DIAGNOSTIC performed by Abhishek Santiago MD at [address] [place] [address], DIAGNOSTIC      HYSTERECTOMY ([facility])  12/2/15    robotic assisted LAVH BSO    NASAL SEPTUM SURGERY  1992    UPPER GASTROINTESTINAL ENDOSCOPY N/A 2/12/2021    EGD BIOPSY performed by Abhishek Santiago MD at [address]  Current Outpatient Medications   Medication Sig Dispense Refill    Multiple Vitamins-Minerals (WOMENS MULTIVITAMIN PO) Take by mouth      traMADol (ULTRAM) 50 MG tablet Take 1 tablet by mouth every 6 hours as needed for Pain for up to 5 days. Intended supply: 5 days. Take lowest dose possible to manage pain 20 tablet 0    Baclofen (LIORESAL) 5 MG tablet Take 1 or 2 tablets by mouth up to three times daily as needed for back pain. 30 tablet 0    Psyllium (METAMUCIL SMOOTH TEXTURE) 28.3 % POWD Take by mouth 1 tablespoon with 8 ounces of water every day. OTC      guaiFENesin (MUCINEX MAXIMUM STRENGTH) 1200 MG TB12 Take by mouth 1 tablet daily. OTC      Probiotic Product (PROBIOTIC DAILY PO) Take 1 tablet by mouth daily       No current facility-administered medications for this visit.        ALLERGIES  Allergies   Allergen Reactions    Lac Bovis Other (See Comments)     Unknown    Other Other (See Comments)     Grass - Unknown    Zithromax [Azithromycin] Hives    Augmentin [Amoxicillin-Pot Clavulanate] Nausea And Vomiting    Oxycodone Nausea And Vomiting    Percocet [Oxycodone-Acetaminophen] Nausea And Vomiting    Vicodin [Hydrocodone-Acetaminophen] Nausea And Vomiting       RECENT LABS    No results found for: LABA1C  No results found for: EAG    No results found for: CHOL  No results found for: Yelitza Ford [address]    Lab Results   Component Value Date    WBC 7.1 02/05/2021    HGB 14.1 02/05/2021    HCT 43.4 02/05/2021    MCV 99.3 02/05/2021     02/05/2021       PHYSICAL EXAM  /70   Pulse 65   Ht 5' 4\" (1.626 m)   Wt 129 lb (58.5 kg)   LMP 11/30/2013 (Within Years) Comment: states a year and a half ago  SpO2 96%   BMI 22.14 kg/m²     Physical Exam  Constitutional:       Appearance: Normal appearance. HENT:      Head: Normocephalic and atraumatic. Eyes:      Comments: EOM grossly intact. Cardiovascular:      Rate and Rhythm: Normal rate and regular rhythm. Heart sounds: No murmur heard. No friction rub. No gallop. Pulmonary:      Effort: Pulmonary effort is normal.      Breath sounds: Normal breath sounds. No wheezing, rhonchi or rales. Abdominal:      Comments: Patient is mildly TTP in bilateral lower quadrants, right worse than left. Musculoskeletal:      Comments: Normal lordotic curve. No TTP over spinal processes. No TTP over paravertebral muscles. Normal range of motion in flexion, extension, lateral bending, and lateral rotation. Normal gait. Skin:     General: Skin is warm and dry. Neurological:      Mental Status: She is alert and oriented to person, place, and time. Comments: Cranial nerves II-XII grossly intact   Psychiatric:         Mood and Affect: Mood normal.         Behavior: Behavior normal.         ASSESSMENT & PLAN  1. Hematochezia  We will obtain CT abdomen and pelvis, given the patient is rating her pain as 9 out of 10. She has a history of diverticulitis and believes this is what is causing her symptoms.  Patient will get labs while at the imaging center.    - CT ABDOMEN PELVIS WO CONTRAST Additional Contrast? Radiologist Recommendation; Future  - CBC with Auto Differential; Future  - C-Reactive Protein; Future  - Sedimentation Rate; Future    2. Generalized abdominal pain  See above (#1).  - CT ABDOMEN PELVIS WO CONTRAST Additional Contrast? Radiologist Recommendation; Future  - CBC with Auto Differential; Future  - C-Reactive Protein; Future  - Sedimentation Rate; Future    3. History of diverticulitis  See above (#1).  - CT ABDOMEN PELVIS WO CONTRAST Additional Contrast? Radiologist Recommendation; Future    4. Acute bilateral low back pain without sciatica  Patient attributes all her symptoms to diverticulitis as she states that she always gets back pain like this when she has a flare of diverticulitis See above (#1), though her symptoms are consistent with muscle strain. Prescribed tramadol and baclofen for back pain. Warned patient of side effects including drowsiness. - traMADol (ULTRAM) 50 MG tablet; Take 1 tablet by mouth every 6 hours as needed for Pain for up to 5 days. Intended supply: 5 days. Take lowest dose possible to manage pain  Dispense: 20 tablet; Refill: 0  - Baclofen (LIORESAL) 5 MG tablet; Take 1 or 2 tablets by mouth up to three times daily as needed for back pain. Dispense: 30 tablet; Refill: 0      No follow-ups on file.             Electronically signed by Ankit Cantu PA-C on 6/23/2022

## 2022-06-21 NOTE — LETTER
[address] [place] [address] Daniele Birch  Phone: [phone]  Fax: [phone]    Rush Cespedes        June 21, 2022     Patient: Anthony Castro   YOB: 1965   Date of Visit: 6/21/2022       To Whom it May Concern:    Abraham Foster was seen in my clinic on 6/21/2022. Please excuse her from work 6/21/22 through 6/22/22. She may return on 6/23/22. If you have any questions or concerns, please don't hesitate to call.     Sincerely,         Riri Mcguire PA-C

## 2022-06-22 ENCOUNTER — TELEPHONE (OUTPATIENT)
Dept: FAMILY MEDICINE CLINIC | Age: 57
End: 2022-06-22

## 2022-06-22 NOTE — TELEPHONE ENCOUNTER
[facility] called to check that we received CT results for the patient. Confirmed results are attached to the report. Kenneth Six saw the patient yesterday. Please review.

## 2022-06-23 ENCOUNTER — TELEPHONE (OUTPATIENT)
Dept: FAMILY MEDICINE CLINIC | Age: 57
End: 2022-06-23

## 2022-06-23 DIAGNOSIS — K52.9 ACUTE GASTROENTERITIS: Primary | ICD-10-CM

## 2022-06-23 RX ORDER — FAMOTIDINE 20 MG/1
20 TABLET, FILM COATED ORAL 2 TIMES DAILY PRN
Qty: 30 TABLET | Refills: 0 | Status: SHIPPED | OUTPATIENT
Start: 2022-06-23 | End: 2022-07-07

## 2022-06-23 RX ORDER — ONDANSETRON 4 MG/1
4-8 TABLET, FILM COATED ORAL EVERY 8 HOURS PRN
Qty: 30 TABLET | Refills: 0 | Status: SHIPPED | OUTPATIENT
Start: 2022-06-23 | End: 2022-07-07

## 2022-06-23 NOTE — TELEPHONE ENCOUNTER
Spoke with the patient per Higinio's note. Patient agreed and voiced understanding. Patient denied referral to dr. Tulio Ayoub at this time, states she recently had a colonoscopy. Patient agreed to proceed with the zofran and pepcid and stated she uses [address] [place].

## 2022-06-23 NOTE — TELEPHONE ENCOUNTER
Please call the patient and let her know that I reviewed the results of her CT scan. It showed NO DIVERTICULITIS. So the blood in her stool is likely caused by her hemorrhoids. Would she like for me to send in some steroid suppositories to help with the discomfort and bleeding? There is one GI doctor here in town that does actually take care of hemorrhoids and sometimes offers procedures for hemorrhoids: Dr. Michelle Salvador. Is she interested in a referral?     Though the CT showed no diverticulitis (inflammation in her large bowel), it did show ENTERITIS (inflammation of her SMALL bowel) and GASTRITIS (inflammation of her stomach). Gastroenteritis is most commonly caused by VIRUSES. So rather than treating with antibiotics, we treated symptomatically (Zofran and Pepcid if she's nauseous, excellent hydration with water, rest, and gentle diet - no high fat foods like fried foods or dairy, mostly vegetable broth, bananas, rice, toast, etc). If symptoms are not improving within the next week, let us know. The patient does need to get her BLOOD WORK done, though. Please stop by the lab to get this done within the next few days. 69M domiciled at [place], PPH: schizophrenia, PMH: Parkinson disease, COPD, sent to [facility]'s ED for agitated behavior. Pt presents agitated, hostile, disorganised, appears to be responding to internal stimuli and paranoid about medications in context of poor adherence to medication regimen. Pt continues to refuse medications. TOO to be requested.    #Schizophrenia  -change depakene syrup 500 mg bid to depakote sprinkles 500 mg bid to increase adherence  -continue haldol concentrate 2 mg bid  - pt is non compliant with medications; will pursue TOO  -c/w cogentin 1 mg bid  -c/w melatonin 5 mg qhs  -start haldol concentrate 2.5 mg q8h prn for agitation  -c/w hydroxyzine 50 mg q6h prn for anxiety or/and insomnia    #HTN, Parkinsons, COPD  -c/w home medications  -medicine consulted  -PT/OT consult pending    #Poor PO Intake  -RD consulted; Ensure with meals  -Monitor weight every 3 days

## 2022-12-09 ENCOUNTER — TELEPHONE (OUTPATIENT)
Dept: FAMILY MEDICINE CLINIC | Age: 57
End: 2022-12-09

## 2022-12-09 NOTE — TELEPHONE ENCOUNTER
Call transferred from the [facility] ([facility]). Patient has been having bloody/black stool with clots for the last 3 days. History of diverticula. Feeling better today. Offered appointment for today but patient states she can't today and needs to do next week. Offered appointment Monday but patient unable so appointment schedule Tuesday. Advised if it gets worse or doesn't start lessening the amount of blood she would need to go to the ER for eval.  Dr Franci Nowak notified, advised appt today but patient not willing.

## 2022-12-13 ENCOUNTER — OFFICE VISIT (OUTPATIENT)
Dept: FAMILY MEDICINE CLINIC | Age: 57
End: 2022-12-13
Payer: COMMERCIAL

## 2022-12-13 VITALS
HEIGHT: 64 IN | RESPIRATION RATE: 16 BRPM | BODY MASS INDEX: 22.53 KG/M2 | WEIGHT: 132 LBS | DIASTOLIC BLOOD PRESSURE: 60 MMHG | OXYGEN SATURATION: 97 % | SYSTOLIC BLOOD PRESSURE: 110 MMHG | HEART RATE: 66 BPM

## 2022-12-13 DIAGNOSIS — K57.92 DIVERTICULITIS: Primary | ICD-10-CM

## 2022-12-13 DIAGNOSIS — K21.9 GASTROESOPHAGEAL REFLUX DISEASE, UNSPECIFIED WHETHER ESOPHAGITIS PRESENT: ICD-10-CM

## 2022-12-13 DIAGNOSIS — E78.5 HYPERLIPIDEMIA, UNSPECIFIED HYPERLIPIDEMIA TYPE: ICD-10-CM

## 2022-12-13 DIAGNOSIS — L30.9 DERMATITIS: ICD-10-CM

## 2022-12-13 DIAGNOSIS — K92.1 MELENA: ICD-10-CM

## 2022-12-13 DIAGNOSIS — R10.84 GENERALIZED ABDOMINAL PAIN: ICD-10-CM

## 2022-12-13 PROCEDURE — G8484 FLU IMMUNIZE NO ADMIN: HCPCS | Performed by: PHYSICIAN ASSISTANT

## 2022-12-13 PROCEDURE — G8427 DOCREV CUR MEDS BY ELIG CLIN: HCPCS | Performed by: PHYSICIAN ASSISTANT

## 2022-12-13 PROCEDURE — 3017F COLORECTAL CA SCREEN DOC REV: CPT | Performed by: PHYSICIAN ASSISTANT

## 2022-12-13 PROCEDURE — 99215 OFFICE O/P EST HI 40 MIN: CPT | Performed by: PHYSICIAN ASSISTANT

## 2022-12-13 PROCEDURE — 4004F PT TOBACCO SCREEN RCVD TLK: CPT | Performed by: PHYSICIAN ASSISTANT

## 2022-12-13 PROCEDURE — G8420 CALC BMI NORM PARAMETERS: HCPCS | Performed by: PHYSICIAN ASSISTANT

## 2022-12-13 RX ORDER — METRONIDAZOLE 500 MG/1
500 TABLET ORAL 3 TIMES DAILY
Qty: 21 TABLET | Refills: 0 | Status: SHIPPED | OUTPATIENT
Start: 2022-12-13 | End: 2022-12-20

## 2022-12-13 RX ORDER — TRIAMCINOLONE ACETONIDE 1 MG/G
CREAM TOPICAL
Qty: 80 G | Refills: 0 | Status: SHIPPED | OUTPATIENT
Start: 2022-12-13

## 2022-12-13 RX ORDER — CIPROFLOXACIN 500 MG/1
500 TABLET, FILM COATED ORAL 2 TIMES DAILY
Qty: 14 TABLET | Refills: 0 | Status: SHIPPED | OUTPATIENT
Start: 2022-12-13 | End: 2022-12-20

## 2022-12-13 NOTE — PATIENT INSTRUCTIONS
[facility] - [address], [address]  [place], [address] [facility],Third Floor  [phone]

## 2022-12-13 NOTE — PROGRESS NOTES
12/13/2022    Allison Cote    Chief Complaint   Patient presents with    Stool Color Change     Presenting for black stool, some blood clots. Blood is normal after constipation. H/ O Diverticulitis. Wife ha H-Pylori. Other     She would like her heart listened to. Her Mother had blockage/ Stents recently. Hearing Problem     H/o working around loud noises. She has trouble hearing bilateral ears. HPI  History obtained from the patient. Chad Herrera is a 65 y.o. female who presents today for blood in stool X 1 week. Diverticulitis - Patient complains of blood in her stool x1 week. She is concerned that she has another flare of diverticulitis. Admits generalized abdominal pain. She states that the blood in her stool is bright red and she has had clots of blood this time. patient does have a history of diverticulitis, as well as hemorrhoids. It was diagnosed 10-15 years ago. She follows with GI in [place] and has worked on changing her diet to avoid flares. Patient states that she ate a lot of peanut butter fudge over Thanksgiving and has also been under a lot of stress with her wife and mother both being sick recently, and she wonders if these factors may have contributed to her flare. Denies fever. States that she did have some chills on Friday and Saturday, though. Previous Abdominal surgeries include: Cholecystectomy, total hysterectomy removed due to cysts. Patient is up-to-date on COLON CANCER SCREENING-she had a colonoscopy February 2021. Upper GI Symptoms - The patient states that once in a while whe has a dark black, tarry stool. Admits nausea, acid reflux, vomiting. She's been taking a lot of TUMS. The patient states that her wife has recently been diagnosed with H. pylori gastritis, and the patient is concerned she may have H. pylori, as well.     Family Hx of CAD -patient explains that she has a family history of coronary artery disease in her mother, and she is concerned about cardiovascular risk for herself. Decreased Hearing - Patient states that her wife complains that she has to repeat herself a lot and has told the patient that she needs to get her hearing tested. REVIEW OF SYMPTOMS  Review of Systems   Constitutional: Negative for chills and fever. Respiratory: Negative for cough and shortness of breath. Gastrointestinal: Negative for diarrhea and vomiting.      PAST MEDICAL HISTORY  Past Medical History:   Diagnosis Date    Diverticular disease     has had 2 episodes of diverticulitis    Irritable bowel        FAMILY HISTORY  Family History   Problem Relation Age of Onset    Arthritis Mother     Diabetes Father     High Blood Pressure Sister        SOCIAL HISTORY  Social History     Socioeconomic History    Marital status:      Spouse name: None    Number of children: None    Years of education: None    Highest education level: None   Tobacco Use    Smoking status: Every Day     Packs/day: 2.00     Types: Cigarettes    Smokeless tobacco: Never   Substance and Sexual Activity    Alcohol use: No    Drug use: No    Sexual activity: Yes     Partners: Female        SURGICAL HISTORY  Past Surgical History:   Procedure Laterality Date    CHOLECYSTECTOMY  2015    COLONOSCOPY N/A 2/12/2021    COLONOSCOPY DIAGNOSTIC performed by Julianna Wilson MD at [facility], DIAGNOSTIC      HYSTERECTOMY (CERVIX STATUS UNKNOWN)  12/2/15    robotic assisted LAVH BSO    NASAL SEPTUM SURGERY  1992    UPPER GASTROINTESTINAL ENDOSCOPY N/A 2/12/2021    EGD BIOPSY performed by Julianna Wilson MD at [address]  Current Outpatient Medications   Medication Sig Dispense Refill    Calcium Carbonate Antacid (TUMS E-X PO) Take by mouth      metroNIDAZOLE (FLAGYL) 500 MG tablet Take 1 tablet by mouth 3 times daily for 7 days 21 tablet 0    ciprofloxacin (CIPRO) 500 MG tablet Take 1 tablet by mouth 2 times daily for 7 days 14 tablet 0    triamcinolone (KENALOG) 0.1 % cream Apply topically 2 times daily. 80 g 0    Multiple Vitamins-Minerals (WOMENS MULTIVITAMIN PO) Take by mouth      guaiFENesin 1200 MG TB12 Take by mouth 1 tablet daily. OTC      Probiotic Product (PROBIOTIC DAILY PO) Take 1 tablet by mouth daily      Psyllium (METAMUCIL SMOOTH TEXTURE) 28.3 % POWD Take by mouth 1 tablespoon with 8 ounces of water every day. OTC       No current facility-administered medications for this visit. ALLERGIES  Allergies   Allergen Reactions    Lac Bovis Other (See Comments)     Unknown    Other Other (See Comments)     Grass - Unknown    Zithromax [Azithromycin] Hives    Augmentin [Amoxicillin-Pot Clavulanate] Nausea And Vomiting    Oxycodone Nausea And Vomiting    Percocet [Oxycodone-Acetaminophen] Nausea And Vomiting    Vicodin [Hydrocodone-Acetaminophen] Nausea And Vomiting       RECENT LABS    No results found for: LABA1C  No results found for: EAG    No results found for: CHOL  No results found for: [address]    Lab Results   Component Value Date    WBC 7.1 02/05/2021    HGB 14.1 02/05/2021    HCT 43.4 02/05/2021    MCV 99.3 02/05/2021     02/05/2021       PHYSICAL EXAM  /60   Pulse 66   Resp 16   Ht 5' 4\" (1.626 m)   Wt 132 lb (59.9 kg)   LMP 11/30/2013 (Within Years) Comment: states a year and a half ago  SpO2 97%   BMI 22.66 kg/m²     Physical Exam  Constitutional:       Appearance: Normal appearance. HENT:      Head: Normocephalic and atraumatic. Eyes:      Comments: EOM grossly intact. Cardiovascular:      Rate and Rhythm: Normal rate and regular rhythm. Heart sounds: No murmur heard. No friction rub. No gallop. Pulmonary:      Effort: Pulmonary effort is normal.      Breath sounds: Normal breath sounds. No wheezing, rhonchi or rales. Abdominal:      General: Abdomen is flat. There is no distension. Palpations: Abdomen is soft. There is no mass. Tenderness: There is abdominal tenderness. Comments: Patient is mildly tender to epigastric region and all lower quadrants. Skin:     General: Skin is warm and dry. Neurological:      Mental Status: She is alert and oriented to person, place, and time. Comments: Cranial nerves II-XII grossly intact   Psychiatric:         Mood and Affect: Mood normal.         Behavior: Behavior normal.       ASSESSMENT & PLAN  1. Diverticulitis  We will check blood work including CBC and will treat with metronidazole and Flagyl. Continue probiotics. Call and let me know if symptoms have not improved within the next few days on the antibiotics. - Comprehensive Metabolic Panel; Future  - CBC with Auto Differential; Future  - metroNIDAZOLE (FLAGYL) 500 MG tablet; Take 1 tablet by mouth 3 times daily for 7 days  Dispense: 21 tablet; Refill: 0  - ciprofloxacin (CIPRO) 500 MG tablet; Take 1 tablet by mouth 2 times daily for 7 days  Dispense: 14 tablet; Refill: [facility] SILVIA Reynolds, Gastroenterology, [place]  - Lipase; Future    2. Hyperlipidemia, unspecified hyperlipidemia type  We will update fasting blood work. - Comprehensive Metabolic Panel; Future  - Lipid Panel; Future  - CBC with Auto Differential; Future    3. Generalized abdominal pain   see above (#2).  - Comprehensive Metabolic Panel; Future  - CBC with Auto Differential; Future  - Lipase; Future    4. Melena  Patient likely needs EGD. She is complaining of occasional black tarry stools, acid reflux, and she is concerned about exposure to H. pylori. Placed referral to [facility] [address] [place] [address] SIL[facility]IA [facility], Gastroenterology, [place]    5. Gastroesophageal reflux disease, unspecified whether esophagitis present  See above (#4). - Emely Espinoza CNP, Gastroenterology, [place]    6. Dermatitis  Patient complains of significant itching on her legs. Prescribed steroid cream for her to try.   - triamcinolone (KENALOG) 0.1 % cream; Apply topically 2 times daily. Dispense: 80 g; Refill: 0    I spent 40 minutes on this encounter, reviewing patient records, gathering history, examining the patient, and documenting. No follow-ups on file.             Electronically signed by Parth Martinez PA-C on 12/13/2022

## 2022-12-16 ENCOUNTER — TELEPHONE (OUTPATIENT)
Dept: GASTROENTEROLOGY | Age: 57
End: 2022-12-16

## 2022-12-16 NOTE — TELEPHONE ENCOUNTER
Called pt. In regards to a referral for diverticulitis, melena and GERD. Pt. Stated she was put on a new medication and feeling better so she will call us if she needs to make an appt.

## 2023-06-16 ENCOUNTER — APPOINTMENT (OUTPATIENT)
Dept: GENERAL RADIOLOGY | Age: 58
End: 2023-06-16
Payer: COMMERCIAL

## 2023-06-16 ENCOUNTER — HOSPITAL ENCOUNTER (EMERGENCY)
Age: 58
Discharge: HOME | End: 2023-06-16
Payer: COMMERCIAL

## 2023-06-16 VITALS
HEART RATE: 60 BPM | SYSTOLIC BLOOD PRESSURE: 97 MMHG | OXYGEN SATURATION: 97 % | TEMPERATURE: 98.3 F | BODY MASS INDEX: 21.34 KG/M2 | HEIGHT: 64 IN | RESPIRATION RATE: 18 BRPM | WEIGHT: 125 LBS | DIASTOLIC BLOOD PRESSURE: 55 MMHG

## 2023-06-16 DIAGNOSIS — M79.601 PAIN OF RIGHT UPPER EXTREMITY: Primary | ICD-10-CM

## 2023-06-16 PROCEDURE — 73090 X-RAY EXAM OF FOREARM: CPT

## 2023-06-16 PROCEDURE — 96372 THER/PROPH/DIAG INJ SC/IM: CPT

## 2023-06-16 PROCEDURE — 99284 EMERGENCY DEPT VISIT MOD MDM: CPT

## 2023-06-16 PROCEDURE — 73060 X-RAY EXAM OF HUMERUS: CPT

## 2023-06-16 PROCEDURE — 6360000002 HC RX W HCPCS: Performed by: PHYSICIAN ASSISTANT

## 2023-06-16 PROCEDURE — 73030 X-RAY EXAM OF SHOULDER: CPT

## 2023-06-16 PROCEDURE — 6370000000 HC RX 637 (ALT 250 FOR IP): Performed by: PHYSICIAN ASSISTANT

## 2023-06-16 RX ORDER — ONDANSETRON 4 MG/1
4 TABLET, ORALLY DISINTEGRATING ORAL ONCE
Status: COMPLETED | OUTPATIENT
Start: 2023-06-16 | End: 2023-06-16

## 2023-06-16 RX ORDER — TRAMADOL HYDROCHLORIDE 50 MG/1
50 TABLET ORAL EVERY 6 HOURS PRN
Qty: 12 TABLET | Refills: 0 | Status: SHIPPED | OUTPATIENT
Start: 2023-06-16 | End: 2023-06-19

## 2023-06-16 RX ORDER — MORPHINE SULFATE 4 MG/ML
4 INJECTION, SOLUTION INTRAMUSCULAR; INTRAVENOUS ONCE
Status: COMPLETED | OUTPATIENT
Start: 2023-06-16 | End: 2023-06-16

## 2023-06-16 RX ADMIN — MORPHINE SULFATE 4 MG: 4 INJECTION, SOLUTION INTRAMUSCULAR; INTRAVENOUS at 20:44

## 2023-06-16 RX ADMIN — ONDANSETRON 4 MG: 4 TABLET, ORALLY DISINTEGRATING ORAL at 20:44

## 2023-06-16 ASSESSMENT — PAIN SCALES - GENERAL: PAINLEVEL_OUTOF10: 10

## 2023-06-17 NOTE — ED TRIAGE NOTES
Patient presents to the ED with complaints of right arm injury. Patient states she works at [employer]. 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 Patient states she is having pain in her right wrist/elbow/shoulder.

## 2023-09-18 ENCOUNTER — OFFICE VISIT (OUTPATIENT)
Dept: FAMILY MEDICINE CLINIC | Age: 58
End: 2023-09-18
Payer: COMMERCIAL

## 2023-09-18 ENCOUNTER — NURSE TRIAGE (OUTPATIENT)
Dept: OTHER | Facility: CLINIC | Age: 58
End: 2023-09-18

## 2023-09-18 VITALS
DIASTOLIC BLOOD PRESSURE: 62 MMHG | OXYGEN SATURATION: 96 % | HEART RATE: 65 BPM | BODY MASS INDEX: 21.85 KG/M2 | SYSTOLIC BLOOD PRESSURE: 104 MMHG | TEMPERATURE: 97.7 F | HEIGHT: 64 IN | WEIGHT: 128 LBS

## 2023-09-18 DIAGNOSIS — K64.9 HEMORRHOIDS, UNSPECIFIED HEMORRHOID TYPE: ICD-10-CM

## 2023-09-18 DIAGNOSIS — Z87.19 HISTORY OF DIVERTICULITIS: ICD-10-CM

## 2023-09-18 DIAGNOSIS — R10.32 LLQ ABDOMINAL PAIN: Primary | ICD-10-CM

## 2023-09-18 PROCEDURE — 99214 OFFICE O/P EST MOD 30 MIN: CPT | Performed by: PHYSICIAN ASSISTANT

## 2023-09-18 RX ORDER — HYDROCORTISONE ACETATE 25 MG/1
25 SUPPOSITORY RECTAL EVERY 12 HOURS
Qty: 30 SUPPOSITORY | Refills: 2 | Status: SHIPPED | OUTPATIENT
Start: 2023-09-18

## 2023-09-18 RX ORDER — CIPROFLOXACIN 500 MG/1
500 TABLET, FILM COATED ORAL 2 TIMES DAILY
Qty: 20 TABLET | Refills: 0 | Status: SHIPPED | OUTPATIENT
Start: 2023-09-18 | End: 2023-09-28

## 2023-09-18 RX ORDER — METRONIDAZOLE 500 MG/1
500 TABLET ORAL 3 TIMES DAILY
Qty: 30 TABLET | Refills: 0 | Status: SHIPPED | OUTPATIENT
Start: 2023-09-18 | End: 2023-09-28

## 2023-09-18 NOTE — TELEPHONE ENCOUNTER
Location of patient: [address] call from [employer] at [employer] with [employer]. Subjective: Caller states \"This is just and flare up\"     Current Symptoms: history of diverticulitis, bright red blood in the stool (every time), yesterday had diarrhea (dark stool - described as black), BM's are labile - sometimes constipation - sometimes diarrhea - sometimes no BM), stools are sometimes full of mucus, subjective fever yesterday with chills and sweats    Onset: 5 years ago; worsened about 2 weeks ago    Associated Symptoms: NA    Pain Severity: 2/10; cramping; intermittent  (Some mild but normal cramping prior to bowel movements    Temperature: denies       What has been tried: Nothing    LMP: NA Pregnant: No    Recommended disposition: Go to ED Now (patient refuses this disposition as she has been dealing with these issues for years)    Care advice provided, patient verbalizes understanding; denies any other questions or concerns; instructed to call back for any new or worsening symptoms. Writer provided warm transfer to Skyla Spencer at [facility] for second level triage flagged by moderate rectal bleeding    Attention Provider: Thank you for allowing me to participate in the care of your patient. The patient was connected to triage in response to information provided to the ECC/PSC. Please do not respond through this encounter as the response is not directed to a shared pool.       Reason for Disposition   MODERATE rectal bleeding (small blood clots, passing blood without stool, or toilet water turns red) more than once a day    Protocols used: Rectal Bleeding-ADULT-[place]

## 2023-09-18 NOTE — PROGRESS NOTES
9/18/2023    Patricia Davidson    Chief Complaint   Patient presents with    Hematochezia     - per pt blood in the stool - pt states she has intermittent episodes diverticulitis - flare up over past 2 weeks, worse 9/16/23 & 9/17/23 - abdominal pain, feeling need to have bm, small stools, nausea & vomiting, tried laxative 9/15/23 had bm with bright red to purplish blood. Had blood in bm 9/17/23 & today. HPI  History was obtained from patient. Carie Garcia is a 63 y.o. female who presents today with concerns for diverticulitis. 2 weeks ago, patient developed bowel changes and intermittent abdominal pain, worse in the left lower quadrant. She is alternating between looser stools and constipation. Denies any melena or hematochezia at this time. Denies nausea, vomiting, fever or chills. She states she has had multiple episodes of diverticulitis in her lifetime. She also has a well-established history of internal and external hemorrhoids. In 2021,, she was following NP gastroenterologist Valentino Pedlar, as well as general surgeon Dr. Judy Kim    Feb 2021, she had EGD and C-scope. Her EGD showed normal appearing esophagus, duodenum and mild antral gastritis. Her stomach biopsies showed normal tissue with no evidence of H. Pylori infection. Her colonoscopy revealed severe sigmoid diverticulosis, internal and external hemorrhoids. No family history of colon or stomach cancer. REVIEW OF SYMPTOMS    Constitutional:  Denies fever, chills, weight loss or weakness  ENT:  Denies URI symptoms  Cardiovascular:  Denies chest pain, palpitations   Respiratory:  Denies cough or shortness of breath  GI: See HPI.   Musculoskeletal:  Denies back pain  Skin:  No rashes        PAST MEDICAL HISTORY  Past Medical History:   Diagnosis Date    Diverticular disease     has had 2 episodes of diverticulitis    Irritable bowel        FAMILY HISTORY  Family History   Problem Relation Age of Onset    Arthritis Mother

## 2023-10-18 ENCOUNTER — OFFICE VISIT (OUTPATIENT)
Dept: FAMILY MEDICINE CLINIC | Age: 58
End: 2023-10-18
Payer: COMMERCIAL

## 2023-10-18 VITALS
BODY MASS INDEX: 22.02 KG/M2 | HEIGHT: 64 IN | SYSTOLIC BLOOD PRESSURE: 114 MMHG | WEIGHT: 129 LBS | OXYGEN SATURATION: 99 % | DIASTOLIC BLOOD PRESSURE: 70 MMHG | HEART RATE: 64 BPM

## 2023-10-18 DIAGNOSIS — K59.00 CONSTIPATION, UNSPECIFIED CONSTIPATION TYPE: ICD-10-CM

## 2023-10-18 DIAGNOSIS — K57.90 DIVERTICULOSIS: ICD-10-CM

## 2023-10-18 DIAGNOSIS — Z00.00 ENCOUNTER FOR MEDICAL EXAMINATION TO ESTABLISH CARE: Primary | ICD-10-CM

## 2023-10-18 DIAGNOSIS — M79.641 RIGHT HAND PAIN: ICD-10-CM

## 2023-10-18 PROCEDURE — 99213 OFFICE O/P EST LOW 20 MIN: CPT | Performed by: STUDENT IN AN ORGANIZED HEALTH CARE EDUCATION/TRAINING PROGRAM

## 2023-10-18 RX ORDER — POLYETHYLENE GLYCOL 3350 17 G/17G
17 POWDER, FOR SOLUTION ORAL DAILY
Qty: 510 G | Refills: 2 | Status: SHIPPED | OUTPATIENT
Start: 2023-10-18 | End: 2023-11-17

## 2023-10-18 ASSESSMENT — PATIENT HEALTH QUESTIONNAIRE - PHQ9
8. MOVING OR SPEAKING SO SLOWLY THAT OTHER PEOPLE COULD HAVE NOTICED. OR THE OPPOSITE, BEING SO FIGETY OR RESTLESS THAT YOU HAVE BEEN MOVING AROUND A LOT MORE THAN USUAL: 1
SUM OF ALL RESPONSES TO PHQ QUESTIONS 1-9: 2
1. LITTLE INTEREST OR PLEASURE IN DOING THINGS: 1
10. IF YOU CHECKED OFF ANY PROBLEMS, HOW DIFFICULT HAVE THESE PROBLEMS MADE IT FOR YOU TO DO YOUR WORK, TAKE CARE OF THINGS AT HOME, OR GET ALONG WITH OTHER PEOPLE: 0
SUM OF ALL RESPONSES TO PHQ QUESTIONS 1-9: 2
5. POOR APPETITE OR OVEREATING: 3
SUM OF ALL RESPONSES TO PHQ9 QUESTIONS 1 & 2: 2
SUM OF ALL RESPONSES TO PHQ QUESTIONS 1-9: 8
SUM OF ALL RESPONSES TO PHQ QUESTIONS 1-9: 8
3. TROUBLE FALLING OR STAYING ASLEEP: 3
4. FEELING TIRED OR HAVING LITTLE ENERGY: 1
6. FEELING BAD ABOUT YOURSELF - OR THAT YOU ARE A FAILURE OR HAVE LET YOURSELF OR YOUR FAMILY DOWN: 0
2. FEELING DOWN, DEPRESSED OR HOPELESS: 1
9. THOUGHTS THAT YOU WOULD BE BETTER OFF DEAD, OR OF HURTING YOURSELF: 0
7. TROUBLE CONCENTRATING ON THINGS, SUCH AS READING THE NEWSPAPER OR WATCHING TELEVISION: 0
SUM OF ALL RESPONSES TO PHQ QUESTIONS 1-9: 8
SUM OF ALL RESPONSES TO PHQ QUESTIONS 1-9: 8

## 2023-10-18 ASSESSMENT — ENCOUNTER SYMPTOMS
WHEEZING: 0
SORE THROAT: 0
CONSTIPATION: 1
VOMITING: 0
COUGH: 0
DIARRHEA: 0
NAUSEA: 0
RHINORRHEA: 0
SHORTNESS OF BREATH: 0
ABDOMINAL PAIN: 0

## 2023-10-18 NOTE — ASSESSMENT & PLAN NOTE
-Starting on daily miralax with suppositories as needed  -encouraged increased fiber intake  -Continue drinking plenty of water (at least 64oz daily) to maintain adequate hydration

## 2023-10-18 NOTE — PROGRESS NOTES
Subjective     Patient ID: Scout Patterson is a 63 y.o. female who presents for New Patient (Diverticulitis.  ), Mole (back), and Pruritis (Mostly at night. ). Seen by Virginia Bonilla 1 month ago for diverticulitis flair up. Given outpatient Abx (cipro and flagyl), which gave relief. No longer having abdominal pain, but still gets chronic diarrhea. BM every 3 days. Reports that BM usually look like small little dahlia and she has to strain. Also has hemorrhoids, which has been problem for years. Uses Prep H and Tucks for hemorrhoids. Was given suppositories at last visit for constipation, which she uses BID. She reports that she is very cautious with gurvinder diet for diverticulitis. Avoids strawberries, nuts, and beans. Mostly eats lots of turkey and chicken. 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 There have been several occasions where something hit her right hand or fell on her hand. Also has had wrist and arm pulled back when trying to catch something heavy. Has right hand pain and some  strength weakness. Reports that she lost muscle tone of thenar msucle. Requesting referral to ortho for further treatment. Review of Systems   Constitutional:  Negative for activity change, appetite change and fever. HENT:  Negative for congestion, rhinorrhea and sore throat. Respiratory:  Negative for cough, shortness of breath and wheezing. Gastrointestinal:  Positive for constipation. Negative for abdominal pain, diarrhea, nausea and vomiting. Skin:  Negative for rash. All other systems reviewed and are negative. Objective     Vitals:    10/18/23 0931   BP: 114/70   Pulse: 64   SpO2: 99%       Physical Exam  Vitals and nursing note reviewed. Constitutional:       General: She is not in acute distress. Appearance: Normal appearance. She is normal weight. She is not ill-appearing or diaphoretic.    HENT:

## 2023-10-18 NOTE — ASSESSMENT & PLAN NOTE
-No need to refill and take Abx at this time since flair up has resolved  -discussed proper diet and encouraged increased fiber for normal BM

## 2023-11-04 DIAGNOSIS — L30.9 DERMATITIS: ICD-10-CM

## 2023-11-06 RX ORDER — TRIAMCINOLONE ACETONIDE 1 MG/G
CREAM TOPICAL
Qty: 80 G | Refills: 0 | OUTPATIENT
Start: 2023-11-06

## 2024-01-26 NOTE — ED PROVIDER NOTES
"  Problem: Rehabilitation (IRF) Plan of Care  Goal: Plan of Care Review  Outcome: Progressing  Flowsheets (Taken 1/26/2024 1231)  Progress: improving  Plan of Care Reviewed With: patient  Outcome Evaluation:   mild dysphonia   mild-moderate cogntive-linguistic deficits characterized by decreased STM for details   decreased reasoning/problem solving for mod complex tasks   Communication intact for basic needs   cogntive-linguistic deficits interfere with communication at more complex level     Problem: Rehabilitation (IRF) Plan of Care  Goal: Patient-Specific Goal (Individualized)  Outcome: Progressing  Flowsheets (Taken 1/26/2024 1231)  Patient/Family-Specific Goals (Include Timeframe): by the time i leave this hospital, i want to be better at: \"no pain, taking better care of myself and being more supportive of my wife\"     " Emergency [address] EMERGENCY DEPARTMENT    Patient: Butch Atkinson  MRN: [id number]  : 1965  Date of Evaluation: 2023  ED Provider: Ya Cole PA-C    Chief Complaint       Chief Complaint   Patient presents with    Arm Injury     right       Babar Morrell is a 63 y.o. female who presents to the emergency department for an arm injury. Onset was prior to arrival.  Patient states she was at work and a box was falling from above her so she stuck her arm out in an attempt to catch it. 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 She reports pain from the shoulder to the wrist.  Aching. Worse with movement. She is right-hand dominant. Denies any other injury. ROS     MUSCULOSKELETAL:  + right arm pain. INTEGUMENT:  Denies skin changes. NEUROLOGIC:  Denies any numbness/tingling.     Past History     Past Medical History:   Diagnosis Date    Diverticular disease     has had 2 episodes of diverticulitis    Irritable bowel      Past Surgical History:   Procedure Laterality Date    CHOLECYSTECTOMY      COLONOSCOPY N/A 2021    COLONOSCOPY DIAGNOSTIC performed by Jessy Bell MD at [address] [facility] [place], DIAGNOSTIC      HYSTERECTOMY (CERVIX STATUS UNKNOWN)  12/2/15    robotic assisted LAVH BSO    NASAL SEPTUM SURGERY      UPPER GASTROINTESTINAL ENDOSCOPY N/A 2021    EGD BIOPSY performed by Jessy Bell MD at [address] [place] [address] History     Socioeconomic History    Marital status:      Spouse name: None    Number of children: None    Years of education: None    Highest education level: None   Tobacco Use    Smoking status: Every Day     Packs/day: 2.00     Types: Cigarettes    Smokeless tobacco: Never   Substance and Sexual Activity    Alcohol use: No    Drug use: No    Sexual

## 2024-05-14 ENCOUNTER — TELEPHONE (OUTPATIENT)
Dept: FAMILY MEDICINE CLINIC | Age: 59
End: 2024-05-14

## 2024-05-14 DIAGNOSIS — K57.92 DIVERTICULITIS: Primary | ICD-10-CM

## 2024-05-14 RX ORDER — METRONIDAZOLE 500 MG/1
500 TABLET ORAL 3 TIMES DAILY
Qty: 30 TABLET | Refills: 0 | Status: SHIPPED | OUTPATIENT
Start: 2024-05-14 | End: 2024-05-24

## 2024-05-14 RX ORDER — CIPROFLOXACIN 500 MG/1
500 TABLET, FILM COATED ORAL 2 TIMES DAILY
Qty: 20 TABLET | Refills: 0 | Status: SHIPPED | OUTPATIENT
Start: 2024-05-14 | End: 2024-05-24

## 2024-05-14 NOTE — TELEPHONE ENCOUNTER
PT HAVING A BOUT WITH DIVERTICULITIS-HAS AN APPT 6/3 W/YOU BUT WAS WONDERING IF [facility] COULD BE CALLED IN FOR HER?    [facility]

## 2024-05-16 ENCOUNTER — COMMUNITY OUTREACH (OUTPATIENT)
Dept: FAMILY MEDICINE CLINIC | Age: 59
End: 2024-05-16

## 2024-06-27 ENCOUNTER — OFFICE (OUTPATIENT)
Dept: URBAN - METROPOLITAN AREA CLINIC 16 | Facility: CLINIC | Age: 59
End: 2024-06-27

## 2024-06-27 VITALS
OXYGEN SATURATION: 98 % | DIASTOLIC BLOOD PRESSURE: 76 MMHG | HEART RATE: 75 BPM | HEIGHT: 64 IN | WEIGHT: 128.6 LBS | SYSTOLIC BLOOD PRESSURE: 118 MMHG

## 2024-06-27 DIAGNOSIS — K57.90 DIVERTICULOSIS OF INTESTINE, PART UNSPECIFIED, WITHOUT PERFO: ICD-10-CM

## 2024-06-27 DIAGNOSIS — R13.14 DYSPHAGIA, PHARYNGOESOPHAGEAL PHASE: ICD-10-CM

## 2024-06-27 DIAGNOSIS — K21.9 GASTRO-ESOPHAGEAL REFLUX DISEASE WITHOUT ESOPHAGITIS: ICD-10-CM

## 2024-06-27 PROCEDURE — 99204 OFFICE O/P NEW MOD 45 MIN: CPT | Performed by: INTERNAL MEDICINE

## 2024-06-27 RX ORDER — OMEPRAZOLE 40 MG/1
40 CAPSULE, DELAYED RELEASE ORAL
Qty: 90 | Refills: 3 | Status: ACTIVE
Start: 2024-06-27

## (undated) DEVICE — FORCEPS BX L240CM JAW DIA2.8MM L CAP W/ NDL MIC MESH TOOTH

## (undated) DEVICE — GLOVE ORTHO 8   MSG9480